# Patient Record
Sex: MALE | Race: WHITE | NOT HISPANIC OR LATINO | Employment: FULL TIME | URBAN - METROPOLITAN AREA
[De-identification: names, ages, dates, MRNs, and addresses within clinical notes are randomized per-mention and may not be internally consistent; named-entity substitution may affect disease eponyms.]

---

## 2019-05-31 ENCOUNTER — OFFICE VISIT (OUTPATIENT)
Dept: ORTHOPEDIC SURGERY | Facility: CLINIC | Age: 44
End: 2019-05-31

## 2019-05-31 VITALS — TEMPERATURE: 97.7 F | HEIGHT: 70 IN | BODY MASS INDEX: 26.34 KG/M2 | WEIGHT: 184 LBS

## 2019-05-31 DIAGNOSIS — S83.512A RUPTURE OF ANTERIOR CRUCIATE LIGAMENT OF LEFT KNEE, INITIAL ENCOUNTER: ICD-10-CM

## 2019-05-31 DIAGNOSIS — M25.562 ACUTE PAIN OF LEFT KNEE: Primary | ICD-10-CM

## 2019-05-31 PROCEDURE — 73562 X-RAY EXAM OF KNEE 3: CPT | Performed by: ORTHOPAEDIC SURGERY

## 2019-05-31 PROCEDURE — 99204 OFFICE O/P NEW MOD 45 MIN: CPT | Performed by: ORTHOPAEDIC SURGERY

## 2019-05-31 RX ORDER — BUDESONIDE AND FORMOTEROL FUMARATE DIHYDRATE 160; 4.5 UG/1; UG/1
AEROSOL RESPIRATORY (INHALATION)
COMMUNITY
Start: 2018-01-16

## 2019-05-31 RX ORDER — IBUPROFEN 400 MG/1
200 TABLET ORAL
COMMUNITY

## 2024-03-13 ENCOUNTER — OFFICE VISIT (OUTPATIENT)
Dept: FAMILY MEDICINE CLINIC | Facility: CLINIC | Age: 49
End: 2024-03-13
Payer: COMMERCIAL

## 2024-03-13 VITALS
HEIGHT: 69 IN | DIASTOLIC BLOOD PRESSURE: 88 MMHG | SYSTOLIC BLOOD PRESSURE: 130 MMHG | OXYGEN SATURATION: 95 % | HEART RATE: 88 BPM | BODY MASS INDEX: 29.18 KG/M2 | RESPIRATION RATE: 16 BRPM | TEMPERATURE: 97.8 F | WEIGHT: 197 LBS

## 2024-03-13 DIAGNOSIS — J45.20 MILD INTERMITTENT ASTHMA WITHOUT COMPLICATION: ICD-10-CM

## 2024-03-13 DIAGNOSIS — J30.1 SEASONAL ALLERGIC RHINITIS DUE TO POLLEN: Primary | ICD-10-CM

## 2024-03-13 DIAGNOSIS — Z12.11 SCREEN FOR COLON CANCER: ICD-10-CM

## 2024-03-13 RX ORDER — CLOBETASOL PROPIONATE 0.5 MG/G
CREAM TOPICAL DAILY
COMMUNITY
Start: 2023-07-28 | End: 2024-07-27

## 2024-03-13 RX ORDER — SILDENAFIL CITRATE 20 MG/1
20 TABLET ORAL DAILY
COMMUNITY
Start: 2024-02-02

## 2024-03-13 RX ORDER — ALBUTEROL SULFATE 90 UG/1
1-2 AEROSOL, METERED RESPIRATORY (INHALATION)
COMMUNITY
Start: 2024-03-11 | End: 2024-03-13 | Stop reason: SDUPTHER

## 2024-03-13 RX ORDER — METHYLPREDNISOLONE ACETATE 80 MG/ML
80 INJECTION, SUSPENSION INTRA-ARTICULAR; INTRALESIONAL; INTRAMUSCULAR; SOFT TISSUE ONCE
Status: COMPLETED | OUTPATIENT
Start: 2024-03-13 | End: 2024-03-13

## 2024-03-13 RX ADMIN — METHYLPREDNISOLONE ACETATE 80 MG: 80 INJECTION, SUSPENSION INTRA-ARTICULAR; INTRALESIONAL; INTRAMUSCULAR; SOFT TISSUE at 11:56

## 2024-03-13 NOTE — PATIENT INSTRUCTIONS
Discharge instruction for allergies Depo-Medrol injection,      Recommend in addition to combination and try this for up to 3 weeks,    Some type of nasal steroid combination with Astelin  If need be add Nasalcrom OTC [employer]      1 strategy for severe allergies would be 1 spray of each twice a day and pick which combination works best for you,    If ever needed Alaway for eye allergies in addition to, OTC      For uncontrolled asthma follow-up with allergy and asthma if ever otherwise use a controller year-round, rinse mouth.    Follow-up annual physical annual labs

## 2024-03-20 PROBLEM — J30.1 SEASONAL ALLERGIC RHINITIS DUE TO POLLEN: Status: ACTIVE | Noted: 2024-03-20

## 2024-03-20 PROBLEM — J45.20 MILD INTERMITTENT ASTHMA WITHOUT COMPLICATION: Status: ACTIVE | Noted: 2024-03-20

## 2024-03-20 RX ORDER — BUDESONIDE AND FORMOTEROL FUMARATE DIHYDRATE 160; 4.5 UG/1; UG/1
2 AEROSOL RESPIRATORY (INHALATION)
Qty: 3 EACH | Refills: 3 | Status: SHIPPED | OUTPATIENT
Start: 2024-03-20

## 2024-03-20 RX ORDER — ALBUTEROL SULFATE 90 UG/1
2 AEROSOL, METERED RESPIRATORY (INHALATION) EVERY 4 HOURS PRN
Qty: 6.7 G | Refills: 3 | Status: SHIPPED | OUTPATIENT
Start: 2024-03-20

## 2024-03-20 NOTE — PROGRESS NOTES
"Chief Complaint  Sinusitis (Pt having issue with sinus congestion, runny nose for the past few day )    Subjective        James Hilliard presents to [facility]  History of Present Illness  Pleasant gentleman here today has allergies, sinus allergies congestion sneezing, as well as history of asthma, wheezing, chronic asthma, generally healthy otherwise no high fever increased chest pain or increased shortness of breath.  He has gotten Depo-Medrol injections in the past they really helped  Social history no tobacco drug alcohol abuse,  Past medical history asthma          Sinusitis      Objective   Vital Signs:  /88   Pulse 88   Temp 97.8 °F (36.6 °C) (Infrared)   Resp 16   Ht 175.3 cm (69\")   Wt 89.4 kg (197 lb)   SpO2 95%   BMI 29.09 kg/m²   Estimated body mass index is 29.09 kg/m² as calculated from the following:    Height as of this encounter: 175.3 cm (69\").    Weight as of this encounter: 89.4 kg (197 lb).          Physical Exam  Vitals reviewed.   Constitutional:       General: He is not in acute distress.     Appearance: He is well-developed. He is not ill-appearing, toxic-appearing or diaphoretic.   HENT:      Head: Normocephalic.      Ears:      Comments: Turbinates congested pharynx is clear TMs are dull, neck is supple no cervical apathy     Nose: Nose normal.   Eyes:      General: No scleral icterus.     Conjunctiva/sclera: Conjunctivae normal.      Pupils: Pupils are equal, round, and reactive to light.   Neck:      Thyroid: No thyromegaly.      Vascular: No JVD.   Cardiovascular:      Rate and Rhythm: Normal rate and regular rhythm.      Heart sounds: Normal heart sounds. No murmur heard.     No friction rub. No gallop.   Pulmonary:      Effort: Pulmonary effort is normal. No respiratory distress.      Breath sounds: No stridor. No rales.   Abdominal:      General: Bowel sounds are normal. There is no distension.      Palpations: Abdomen is soft.      " Tenderness: There is no abdominal tenderness.      Comments: No hepatosplenomegaly, no ascites,   Musculoskeletal:         General: No tenderness.      Cervical back: Neck supple.   Lymphadenopathy:      Cervical: No cervical adenopathy.   Skin:     General: Skin is warm and dry.      Findings: No erythema or rash.   Neurological:      General: No focal deficit present.      Mental Status: He is alert and oriented to person, place, and time. Mental status is at baseline.      Deep Tendon Reflexes: Reflexes are normal and symmetric.   Psychiatric:         Behavior: Behavior normal.         Thought Content: Thought content normal.         Judgment: Judgment normal.      Result Review :                Assessment and Plan   Diagnoses and all orders for this visit:    1. Seasonal allergic rhinitis due to pollen (Primary)  -     methylPREDNISolone acetate (DEPO-medrol) injection 80 mg    2. Screen for colon cancer  -     Ambulatory Referral For Screening Colonoscopy    3. Mild intermittent asthma without complication    Other orders  -     albuterol sulfate  (90 Base) MCG/ACT inhaler; Inhale 2 puffs Every 4 (Four) Hours As Needed for Wheezing.  Dispense: 6.7 g; Refill: 3  -     budesonide-formoterol (Symbicort) 160-4.5 MCG/ACT inhaler; Inhale 2 puffs 2 (Two) Times a Day.  Dispense: 3 each; Refill: 3             Follow Up   No follow-ups on file.  Patient was given instructions and counseling regarding his condition or for health maintenance advice. Please see specific information pulled into the AVS if appropriate.     Patient Instructions   Discharge instruction for allergies Depo-Medrol injection,      Recommend in addition to combination and try this for up to 3 weeks,    Some type of nasal steroid combination with Astelin  If need be add Nasalcrom OTC [employer]      1 strategy for severe allergies would be 1 spray of each twice a day and pick which combination works best for you,    If ever needed Alaway for eye  allergies in addition to, OTC      For uncontrolled asthma follow-up with allergy and asthma if ever otherwise use a controller year-round, rinse mouth.    Follow-up annual physical annual labs

## 2024-07-11 DIAGNOSIS — Z12.11 SCREEN FOR COLON CANCER: Primary | ICD-10-CM

## 2024-07-25 ENCOUNTER — OFFICE VISIT (OUTPATIENT)
Dept: FAMILY MEDICINE CLINIC | Facility: CLINIC | Age: 49
End: 2024-07-25
Payer: COMMERCIAL

## 2024-07-25 VITALS
OXYGEN SATURATION: 96 % | RESPIRATION RATE: 14 BRPM | TEMPERATURE: 97.7 F | HEART RATE: 93 BPM | WEIGHT: 198.6 LBS | SYSTOLIC BLOOD PRESSURE: 132 MMHG | HEIGHT: 69 IN | BODY MASS INDEX: 29.41 KG/M2 | DIASTOLIC BLOOD PRESSURE: 86 MMHG

## 2024-07-25 DIAGNOSIS — R22.1 NECK MASS: Primary | ICD-10-CM

## 2024-07-25 PROCEDURE — 99213 OFFICE O/P EST LOW 20 MIN: CPT | Performed by: NURSE PRACTITIONER

## 2024-07-25 NOTE — PROGRESS NOTES
"Chief Complaint  Neck Pain and clavical pain     Subjective        James Hilliard presents to [facility]  History of Present Illness  Very pleasant gentleman here today he complains of some palpable increased tissue density, right base of his neck and supraclavicular region, feels little bit of tenderness when he moves his neck,  About a month and a half he has no night sweats unexplained weight loss no recurrent infection no chronic abdominal pain  No difficulty swallowing paresthesias other difficulties nothing radiates from his C-spine  Generally good health, does take ibuprofen for aches and pains      No history of malignancy  Neck Pain         Objective   Vital Signs:  /86   Pulse 93   Temp 97.7 °F (36.5 °C) (Infrared)   Resp 14   Ht 175.3 cm (69\")   Wt 90.1 kg (198 lb 9.6 oz)   SpO2 96%   BMI 29.33 kg/m²   Estimated body mass index is 29.33 kg/m² as calculated from the following:    Height as of this encounter: 175.3 cm (69\").    Weight as of this encounter: 90.1 kg (198 lb 9.6 oz).          Physical Exam  Vitals reviewed.   Constitutional:       Appearance: Normal appearance. He is well-developed. He is not ill-appearing or diaphoretic.      Comments: Pleasant gentleman appears well   HENT:      Head: Normocephalic.      Nose: Nose normal.   Eyes:      General: No scleral icterus.     Conjunctiva/sclera: Conjunctivae normal.      Pupils: Pupils are equal, round, and reactive to light.   Neck:      Thyroid: No thyromegaly.      Vascular: No JVD.   Cardiovascular:      Rate and Rhythm: Normal rate and regular rhythm.      Heart sounds: Normal heart sounds. No murmur heard.     No friction rub. No gallop.   Pulmonary:      Effort: Pulmonary effort is normal. No respiratory distress.      Breath sounds: Normal breath sounds. No stridor. No wheezing or rales.   Abdominal:      General: Bowel sounds are normal. There is no distension.      Palpations: Abdomen is soft.     "  Tenderness: There is no abdominal tenderness.      Comments: No hepatosplenomegaly, no ascites, normal spleen normal liver     Musculoskeletal:         General: No tenderness.      Cervical back: Neck supple.      Comments: Full range of motion neck, with right rotation has pain at the supraclavicular region at the base of his neck  No posterior or anterior cervical nodes, neck is supple, no cervical point tenderness  No weakness upper extremities,     Possibly somewhat prominent tissue heard supraclavicular nodes palpable bilateral and somewhat increased on the right enlarged lymph node versus tissue enlargement nonspecific   Lymphadenopathy:      Cervical: No cervical adenopathy.   Skin:     General: Skin is warm and dry.      Findings: No erythema or rash.   Neurological:      General: No focal deficit present.      Mental Status: He is alert and oriented to person, place, and time. Mental status is at baseline.      Deep Tendon Reflexes: Reflexes are normal and symmetric.   Psychiatric:         Mood and Affect: Mood normal.         Behavior: Behavior normal.         Thought Content: Thought content normal.         Judgment: Judgment normal.        Result Review :                Assessment and Plan   Diagnoses and all orders for this visit:    1. Neck mass (Primary)  Comments:  rt supr clav ? lymphnode  Orders:  -     US Head Neck Soft Tissue             Follow Up   Return wk note return today plse.  Patient was given instructions and counseling regarding his condition or for health maintenance advice. Please see specific information pulled into the AVS if appropriate.     Patient Instructions     May continue ibuprofen as needed ibuprofen always lowest effective dose shortest time possible take with food and extra water to protect kidneys,    Monitor blood pressure make sure is controlled quite a bit of risk with ibuprofen    Outpatient soft tissue scan, if enlarged lymph nodes in these region we would need to  get a chest CT,   Make sure you speak with me on the results, as well as you will need a good follow-up regardless of the result  Just to make sure were very thorough with this complaint today and we have resolution let me know any difficulties along the way chest pain shortness of breath fever chills weakness emergency room

## 2024-07-25 NOTE — PATIENT INSTRUCTIONS
May continue ibuprofen as needed ibuprofen always lowest effective dose shortest time possible take with food and extra water to protect kidneys,    Monitor blood pressure make sure is controlled quite a bit of risk with ibuprofen    Outpatient soft tissue scan, if enlarged lymph nodes in these region we would need to get a chest CT,   Make sure you speak with me on the results, as well as you will need a good follow-up regardless of the result  Just to make sure were very thorough with this complaint today and we have resolution let me know any difficulties along the way chest pain shortness of breath fever chills weakness emergency room

## 2024-07-30 ENCOUNTER — TELEPHONE (OUTPATIENT)
Dept: FAMILY MEDICINE CLINIC | Facility: CLINIC | Age: 49
End: 2024-07-30
Payer: COMMERCIAL

## 2024-07-30 NOTE — TELEPHONE ENCOUNTER
It was just approved by insurance. PT is going to call scheduling and will call the office back if he needs assistance.

## 2024-07-30 NOTE — TELEPHONE ENCOUNTER
Initial workup the ultrasound would be the best test as well as less risk  If the ultrasound suggests enlarged lymph node then I will suggest to recommend a CT of the chest with contrast with and without contrast  But initially I think the ultrasound is the most appropriate test    If the ultrasound is negative,  Then I will recommend a short-term follow-up such as 3 months with myself or another physician  To ensure resolution and no change    Let me know if this works for him please thank you

## 2024-07-30 NOTE — TELEPHONE ENCOUNTER
Caller: James Hilliard    Relationship: Self    Best call back number: [phone]    Who are you requesting to speak with (clinical staff, provider,  specific staff member): CLINICAL STAFF    What was the call regarding: STATED THAT THEY WERE TOLD TO CALL IF THEY HAD NOT HEARD ANYTHING ABOUT SCHEDULING THE ULTRASOUND THAT WAS ORDERED. STATED THAT THERE WAS ALSO MENTION OF DOING A CT SCAN IF THE ULTRASOUND DIDN'T GET WHAT WAS NEEDED. STATED THAT THEY WOULD LIKE TO KNOW IF IT IS MORE NECESSARY TO GET THE CT SCAN TO BEGIN WITH. STATED THAT THEY ARE OKAY WITH DOING IT THIS WAY IF THAT IS WHAT NABIL FEELS NEEDS TO BE DONE BUT THEY JUST WANTED TO CHECK. PLEASE CALL AND ADVISE

## 2024-07-30 NOTE — TELEPHONE ENCOUNTER
Caller: James Hilliard    Relationship: Self    Best call back number: [phone]     What is the best time to reach you: ANY AND ASAP    Who are you requesting to speak with (clinical staff, provider,  specific staff member): JAMES EPLEY      What was the call regarding: PATIENT WAS WANTING TO KNOW OUT OF THE ULTRASOUND OR CT SCAN WHICH ONE YOU THOUGHT HE NEEDED THE MOST.  PATIENT WAS ORIGINALLY CONCERNED ABOUT THE COUCH, BUT THEY ARE ACTUALLY THE SAME COST. PATIENT WOULD LIKE TO DO WHICH EVER ONE IS MORE DETAILED.     PLEASE CALL PATIENT TO ADVISE.

## 2024-08-01 NOTE — TELEPHONE ENCOUNTER
Patient is considered about price as both test are 500.00 out of pocket. Pt will call [facility] for pricing and call the office back. I will speak with him when he calls back.

## 2024-08-01 NOTE — TELEPHONE ENCOUNTER
Hub staff attempted to follow warm transfer process and was unsuccessful     Caller: James Hilliard    Relationship to patient: Self    Best call back number:     [phone]        Patient is needing: PATIENT IS CALLING BACK CHECKING ON THIS? PLEASE CALL WITH UPDATE. HE HAS AN APPOINTMENT ON MONDAY 8-5-24. IT HAS BEEN 3 DAYS AND STILL HAS NOT HEARD BACK.

## 2024-08-14 ENCOUNTER — OFFICE VISIT (OUTPATIENT)
Dept: FAMILY MEDICINE CLINIC | Facility: CLINIC | Age: 49
End: 2024-08-14
Payer: COMMERCIAL

## 2024-08-14 VITALS
HEART RATE: 75 BPM | TEMPERATURE: 98.8 F | SYSTOLIC BLOOD PRESSURE: 130 MMHG | RESPIRATION RATE: 16 BRPM | OXYGEN SATURATION: 96 % | HEIGHT: 69 IN | BODY MASS INDEX: 29.64 KG/M2 | WEIGHT: 200.1 LBS | DIASTOLIC BLOOD PRESSURE: 98 MMHG

## 2024-08-14 DIAGNOSIS — F41.9 ANXIETY: ICD-10-CM

## 2024-08-14 DIAGNOSIS — Z00.00 HEALTH MAINTENANCE EXAMINATION: Primary | ICD-10-CM

## 2024-08-14 DIAGNOSIS — E78.2 MIXED HYPERLIPIDEMIA: ICD-10-CM

## 2024-08-14 DIAGNOSIS — E55.9 VITAMIN D DEFICIENCY: ICD-10-CM

## 2024-08-14 DIAGNOSIS — Z12.5 PROSTATE CANCER SCREENING: ICD-10-CM

## 2024-08-14 PROCEDURE — 99396 PREV VISIT EST AGE 40-64: CPT | Performed by: NURSE PRACTITIONER

## 2024-08-14 NOTE — PATIENT INSTRUCTIONS
Discharge instruction    Labs today, will aggressively treat your cholesterol and your triglycerides will work together to lower your triglycerides  As well as to assist you in some steps that might help you quit smoking,  And with some modest weight loss,    Will help you get your blood pressure down a bit, and have other discussions including discussions regarding possible anxiety, or other difficulties that you may have,    Will discuss about screenings to lower risk of stroke and heart attack in early defecation mostly as prevention at this time,    Continue healthy diet and exercise, lots of water 64 ounces water daily,    Follow-up in 3 months to repeat and evaluate right neck region, if there is any question at that time we will refer you to specialist and repeat scanning,

## 2024-08-14 NOTE — PROGRESS NOTES
"Chief Complaint  Hyperlipidemia (Wants to start back on cholesterol medicine; previously on atorvastatin and fenofibrate) and Follow-up (On ultrasound)    Subjective        James Hilliard presents to [facility]  History of Present Illness  Very pleasant gentleman here today follow-up, with increased tissue, possible enlargement of the subclavicular region, of his neck he has some mild discomfort in, and some swelling bilateral but this is chronic nothing new,  Ultrasound recently was negative for any cyst mass or lymphadenopathy, thankfully,  He has no night sweats no unexplained weight loss no recurrent fevers, no family history of Hodgkin's disease or lymphoma,    He has a history of hypertriglyceridemia, dyslipidemia, elevated triglycerides fibrate and statin will need refills will get his labs today he is fasting,  He decreased alcohol use substantially in the past, he has had elevated triglycerides dyslipidemia and taking medicine for at least 7 or 8 years,  He does drink alcohol responsibly, 2 to 3 days a week and more of a larger consumption    Quite a bit of anxiety maybe OCD has never had a evaluation          Hyperlipidemia    Follow-up  Conditions present:  Hyperlipidemia        Objective   Vital Signs:  /98 (BP Location: Right arm, Patient Position: Sitting, Cuff Size: Adult)   Pulse 75   Temp 98.8 °F (37.1 °C) (Oral)   Resp 16   Ht 175.3 cm (69\")   Wt 90.8 kg (200 lb 1.6 oz)   SpO2 96%   BMI 29.55 kg/m²   Estimated body mass index is 29.55 kg/m² as calculated from the following:    Height as of this encounter: 175.3 cm (69\").    Weight as of this encounter: 90.8 kg (200 lb 1.6 oz).          Physical Exam  Vitals reviewed.   Constitutional:       General: He is not in acute distress.     Appearance: Normal appearance. He is well-developed. He is not ill-appearing, toxic-appearing or diaphoretic.   HENT:      Head: Normocephalic.      Nose: Nose normal.   Eyes: "      General: No scleral icterus.     Conjunctiva/sclera: Conjunctivae normal.      Pupils: Pupils are equal, round, and reactive to light.   Neck:      Thyroid: No thyromegaly.      Vascular: No JVD.      Comments: Reexamined neck carefully  No cervical adenopathy no posterior anterior nodes no lateral neck abnormal nodes supraclavicular region free of any mass, he has bilateral appears equal, soft tissue  Prominence equal and likely related to diet and modestly overweight  Appears to be adipose tissue as opposed to any lipoma palpable, really no abnormality palpated  Careful and repeat bilateral supraclavicular region no lymphadenopathy or palpable mass  Patient agrees,  Cardiovascular:      Rate and Rhythm: Normal rate and regular rhythm.      Heart sounds: Normal heart sounds. No murmur heard.     No friction rub. No gallop.   Pulmonary:      Effort: Pulmonary effort is normal. No respiratory distress.      Breath sounds: Normal breath sounds. No stridor. No wheezing or rales.   Abdominal:      General: Bowel sounds are normal. There is no distension.      Palpations: Abdomen is soft.      Tenderness: There is no abdominal tenderness.      Comments: No hepatosplenomegaly, no ascites,   Musculoskeletal:         General: No tenderness.      Cervical back: Neck supple.   Lymphadenopathy:      Cervical: No cervical adenopathy.   Skin:     General: Skin is warm and dry.      Capillary Refill: Capillary refill takes less than 2 seconds.      Findings: No erythema or rash.   Neurological:      General: No focal deficit present.      Mental Status: He is alert and oriented to person, place, and time. Mental status is at baseline.      Deep Tendon Reflexes: Reflexes are normal and symmetric.   Psychiatric:         Mood and Affect: Mood normal.         Behavior: Behavior normal.         Thought Content: Thought content normal.         Judgment: Judgment normal.        Result Review :                Assessment and Plan    Diagnoses and all orders for this visit:    1. Health maintenance examination (Primary)  -     PSA Screen  -     Hemoglobin A1c  -     CBC & Differential  -     Comprehensive Metabolic Panel  -     Lipid Panel With LDL / HDL Ratio  -     TSH Rfx On Abnormal To Free T4  -     Urinalysis With Microscopic If Indicated (No Culture) - Urine, Clean Catch  -     Vitamin D,25-Hydroxy    2. Prostate cancer screening  -     PSA Screen  -     Hemoglobin A1c  -     CBC & Differential  -     Comprehensive Metabolic Panel  -     Lipid Panel With LDL / HDL Ratio  -     TSH Rfx On Abnormal To Free T4  -     Urinalysis With Microscopic If Indicated (No Culture) - Urine, Clean Catch  -     Vitamin D,25-Hydroxy    3. Vitamin D deficiency  -     PSA Screen  -     Hemoglobin A1c  -     CBC & Differential  -     Comprehensive Metabolic Panel  -     Lipid Panel With LDL / HDL Ratio  -     TSH Rfx On Abnormal To Free T4  -     Urinalysis With Microscopic If Indicated (No Culture) - Urine, Clean Catch  -     Vitamin D,25-Hydroxy    4. Mixed hyperlipidemia  -     PSA Screen  -     Hemoglobin A1c  -     CBC & Differential  -     Comprehensive Metabolic Panel  -     Lipid Panel With LDL / HDL Ratio  -     TSH Rfx On Abnormal To Free T4  -     Urinalysis With Microscopic If Indicated (No Culture) - Urine, Clean Catch  -     Vitamin D,25-Hydroxy    5. Anxiety  Comments:  Patient attributes to OCD behaviors             Follow Up   Return in about 1 week (around 8/21/2024) for Labs today.  Patient was given instructions and counseling regarding his condition or for health maintenance advice. Please see specific information pulled into the AVS if appropriate.   Offered referral to ENT, for further evaluation neither myself nor patient can palpate an abnormal mass, this is excess adipose tissue in this region bilateral supraclavicular regions without lymphadenopathy ultrasound was negative nonetheless we should have a follow-up in 3 months to  repeat this exam or second opinion anytime should he develop a mass here urgent recheck    If any lymphadenopathy is regional course he would need a CT of the chest with contrast and he is aware of this he has no symptoms of lymphoma unexplained weight loss night sweats recurrent fevers weakness    Return office discussed anxiety OCD behaviors, will focus aggressively on risk reduction of stroke heart attack and smoking cessation    Patient Instructions   Discharge instruction    Labs today, will aggressively treat your cholesterol and your triglycerides will work together to lower your triglycerides  As well as to assist you in some steps that might help you quit smoking,  And with some modest weight loss,    Will help you get your blood pressure down a bit, and have other discussions including discussions regarding possible anxiety, or other difficulties that you may have,    Will discuss about screenings to lower risk of stroke and heart attack in early defecation mostly as prevention at this time,    Continue healthy diet and exercise, lots of water 64 ounces water daily,    Follow-up in 3 months to repeat and evaluate right neck region, if there is any question at that time we will refer you to specialist and repeat scanning,

## 2024-08-15 LAB
25(OH)D3+25(OH)D2 SERPL-MCNC: 29.4 NG/ML (ref 30–100)
ALBUMIN SERPL-MCNC: 4.5 G/DL (ref 3.5–5.2)
ALBUMIN/GLOB SERPL: 1.9 G/DL
ALP SERPL-CCNC: 111 U/L (ref 39–117)
ALT SERPL-CCNC: 63 U/L (ref 1–41)
APPEARANCE UR: CLEAR
AST SERPL-CCNC: 38 U/L (ref 1–40)
BASOPHILS # BLD AUTO: 0.04 10*3/MM3 (ref 0–0.2)
BASOPHILS NFR BLD AUTO: 0.6 % (ref 0–1.5)
BILIRUB SERPL-MCNC: 0.5 MG/DL (ref 0–1.2)
BILIRUB UR QL STRIP: NEGATIVE
BUN SERPL-MCNC: 13 MG/DL (ref 6–20)
BUN/CREAT SERPL: 14.3 (ref 7–25)
CALCIUM SERPL-MCNC: 9.2 MG/DL (ref 8.6–10.5)
CHLORIDE SERPL-SCNC: 109 MMOL/L (ref 98–107)
CHOLEST SERPL-MCNC: 215 MG/DL (ref 0–200)
CO2 SERPL-SCNC: 26.3 MMOL/L (ref 22–29)
COLOR UR: YELLOW
CREAT SERPL-MCNC: 0.91 MG/DL (ref 0.76–1.27)
EGFRCR SERPLBLD CKD-EPI 2021: 104 ML/MIN/1.73
EOSINOPHIL # BLD AUTO: 0.16 10*3/MM3 (ref 0–0.4)
EOSINOPHIL NFR BLD AUTO: 2.3 % (ref 0.3–6.2)
ERYTHROCYTE [DISTWIDTH] IN BLOOD BY AUTOMATED COUNT: 12.7 % (ref 12.3–15.4)
GLOBULIN SER CALC-MCNC: 2.4 GM/DL
GLUCOSE SERPL-MCNC: 101 MG/DL (ref 65–99)
GLUCOSE UR QL STRIP: NEGATIVE
HBA1C MFR BLD: 5.7 % (ref 4.8–5.6)
HCT VFR BLD AUTO: 50.8 % (ref 37.5–51)
HDLC SERPL-MCNC: 32 MG/DL (ref 40–60)
HGB BLD-MCNC: 17.8 G/DL (ref 13–17.7)
HGB UR QL STRIP: NEGATIVE
IMM GRANULOCYTES # BLD AUTO: 0.06 10*3/MM3 (ref 0–0.05)
IMM GRANULOCYTES NFR BLD AUTO: 0.9 % (ref 0–0.5)
KETONES UR QL STRIP: NEGATIVE
LDLC SERPL CALC-MCNC: 125 MG/DL (ref 0–100)
LDLC/HDLC SERPL: 3.66 {RATIO}
LEUKOCYTE ESTERASE UR QL STRIP: NEGATIVE
LYMPHOCYTES # BLD AUTO: 2.35 10*3/MM3 (ref 0.7–3.1)
LYMPHOCYTES NFR BLD AUTO: 33.8 % (ref 19.6–45.3)
MCH RBC QN AUTO: 31.4 PG (ref 26.6–33)
MCHC RBC AUTO-ENTMCNC: 35 G/DL (ref 31.5–35.7)
MCV RBC AUTO: 89.8 FL (ref 79–97)
MONOCYTES # BLD AUTO: 0.47 10*3/MM3 (ref 0.1–0.9)
MONOCYTES NFR BLD AUTO: 6.8 % (ref 5–12)
NEUTROPHILS # BLD AUTO: 3.87 10*3/MM3 (ref 1.7–7)
NEUTROPHILS NFR BLD AUTO: 55.6 % (ref 42.7–76)
NITRITE UR QL STRIP: NEGATIVE
NRBC BLD AUTO-RTO: 0 /100 WBC (ref 0–0.2)
PH UR STRIP: 6 [PH] (ref 5–8)
PLATELET # BLD AUTO: 187 10*3/MM3 (ref 140–450)
POTASSIUM SERPL-SCNC: 5.7 MMOL/L (ref 3.5–5.2)
PROT SERPL-MCNC: 6.9 G/DL (ref 6–8.5)
PROT UR QL STRIP: NEGATIVE
PSA SERPL-MCNC: 0.24 NG/ML (ref 0–4)
RBC # BLD AUTO: 5.66 10*6/MM3 (ref 4.14–5.8)
SODIUM SERPL-SCNC: 142 MMOL/L (ref 136–145)
SP GR UR STRIP: 1.02 (ref 1–1.03)
TRIGL SERPL-MCNC: 329 MG/DL (ref 0–150)
TSH SERPL DL<=0.005 MIU/L-ACNC: 1.8 UIU/ML (ref 0.27–4.2)
UROBILINOGEN UR STRIP-MCNC: NORMAL MG/DL
VLDLC SERPL CALC-MCNC: 58 MG/DL (ref 5–40)
WBC # BLD AUTO: 6.95 10*3/MM3 (ref 3.4–10.8)

## 2024-08-21 ENCOUNTER — OFFICE VISIT (OUTPATIENT)
Dept: FAMILY MEDICINE CLINIC | Facility: CLINIC | Age: 49
End: 2024-08-21
Payer: COMMERCIAL

## 2024-08-21 VITALS
HEIGHT: 69 IN | RESPIRATION RATE: 16 BRPM | TEMPERATURE: 98.1 F | SYSTOLIC BLOOD PRESSURE: 138 MMHG | BODY MASS INDEX: 29.53 KG/M2 | OXYGEN SATURATION: 98 % | HEART RATE: 80 BPM | DIASTOLIC BLOOD PRESSURE: 98 MMHG | WEIGHT: 199.4 LBS

## 2024-08-21 DIAGNOSIS — F41.9 ANXIETY: Primary | ICD-10-CM

## 2024-08-21 DIAGNOSIS — E78.1 HIGH TRIGLYCERIDES: ICD-10-CM

## 2024-08-21 DIAGNOSIS — E66.3 OVERWEIGHT WITH BODY MASS INDEX (BMI) OF 29 TO 29.9 IN ADULT: ICD-10-CM

## 2024-08-21 DIAGNOSIS — E78.6 LOW HDL (UNDER 40): ICD-10-CM

## 2024-08-21 DIAGNOSIS — R73.03 PREDIABETES: ICD-10-CM

## 2024-08-21 DIAGNOSIS — F10.10 ALCOHOL ABUSE: ICD-10-CM

## 2024-08-21 DIAGNOSIS — Z72.0 TOBACCO ABUSE: ICD-10-CM

## 2024-08-21 DIAGNOSIS — R74.8 ELEVATED LIVER ENZYMES: ICD-10-CM

## 2024-08-21 PROCEDURE — 99214 OFFICE O/P EST MOD 30 MIN: CPT | Performed by: NURSE PRACTITIONER

## 2024-08-21 RX ORDER — ATORVASTATIN CALCIUM 40 MG/1
40 TABLET, FILM COATED ORAL DAILY
Qty: 90 TABLET | Refills: 3 | Status: SHIPPED | OUTPATIENT
Start: 2024-08-21

## 2024-08-21 RX ORDER — SEMAGLUTIDE 0.25 MG/.5ML
0.25 INJECTION, SOLUTION SUBCUTANEOUS WEEKLY
Qty: 2 ML | Refills: 0 | Status: SHIPPED | OUTPATIENT
Start: 2024-08-21

## 2024-08-21 RX ORDER — ESCITALOPRAM OXALATE 5 MG/1
5 TABLET ORAL DAILY
Qty: 30 TABLET | Refills: 1 | Status: SHIPPED | OUTPATIENT
Start: 2024-08-21

## 2024-08-21 RX ORDER — ICOSAPENT ETHYL 1 G/1
2 CAPSULE ORAL 2 TIMES DAILY WITH MEALS
Qty: 360 CAPSULE | Refills: 3 | Status: SHIPPED | OUTPATIENT
Start: 2024-08-21

## 2024-08-21 NOTE — PATIENT INSTRUCTIONS
Discharge instructions,      Wegovy to help decrease appetite to help get your metabolic goals and decrease risk of fatty liver as well as diabetes  You have diabetes and this medication can help reverse this you should follow American diabetes [employer] plan for diabetics and prediabetes there is no substitute diet for you    People places and things, make these changes to help you with your diet, decrease alcohol intake no more than 1 night a week, and I would cut it at least in half  Would be a compromise    Quit smoking when able, just plan these out for better results,      Resume atorvastatin, will see if your insurance will cover Vascepa, as well as Wegovy  If not no stress let me know for alternative plan    Slow steady changes for good health, slow steady weight loss consider intermittent fasting vegetables vegetables vegetables much less bread Posta sweets,    As long as you are doing well and feeling well see back in 3 months, you have prehypertension cut back with breads Posta which are high in sodium tobacco alcohol and this will improve    Out pt liver US     Lexapro 5 mg 1/2 tablet daily start this in the morning, I would start Saturday morning when you are off 1/2 tablet daily for 1 or 2 weeks then 1 tablet daily  Update me for plan    Follow-up in the office in 6 weeks  We can hold off on your liver test as long as it resolves after several months of therapy and improve diet  As you request I think this is reasonable

## 2024-09-02 NOTE — PROGRESS NOTES
"Chief Complaint  Hyperlipidemia    Subjective        James Hilliard presents to [facility]  History of Present Illness  Very pleasant patient here today follow-up prediabetes, hyperlipidemia, elevated blood pressure very motivated about his health,  Would like to quit smoking, making changes,  Chronic anxiety, obsessive-compulsive thoughts,  Has never taking medication for this, open to this, no suicidal ideation agitation    Hyperlipidemia        Objective   Vital Signs:  /98 (BP Location: Left arm, Patient Position: Sitting, Cuff Size: Adult)   Pulse 80   Temp 98.1 °F (36.7 °C) (Oral)   Resp 16   Ht 175.3 cm (69\")   Wt 90.4 kg (199 lb 6.4 oz)   SpO2 98%   BMI 29.45 kg/m²   Estimated body mass index is 29.45 kg/m² as calculated from the following:    Height as of this encounter: 175.3 cm (69\").    Weight as of this encounter: 90.4 kg (199 lb 6.4 oz).            Physical Exam  Vitals reviewed.   Constitutional:       Appearance: Normal appearance. He is well-developed. He is not ill-appearing or diaphoretic.   HENT:      Head: Normocephalic.      Nose: Nose normal.   Eyes:      General: No scleral icterus.     Conjunctiva/sclera: Conjunctivae normal.      Pupils: Pupils are equal, round, and reactive to light.   Neck:      Thyroid: No thyromegaly.      Vascular: No JVD.   Cardiovascular:      Rate and Rhythm: Normal rate and regular rhythm.      Heart sounds: Normal heart sounds. No murmur heard.     No friction rub. No gallop.   Pulmonary:      Effort: Pulmonary effort is normal. No respiratory distress.      Breath sounds: Normal breath sounds. No stridor. No wheezing or rales.   Abdominal:      General: Bowel sounds are normal. There is no distension.      Palpations: Abdomen is soft.      Tenderness: There is no abdominal tenderness.      Comments: No hepatosplenomegaly, no ascites,   Musculoskeletal:         General: No tenderness.      Cervical back: Neck supple. "   Lymphadenopathy:      Cervical: No cervical adenopathy.   Skin:     General: Skin is warm and dry.      Findings: No erythema or rash.   Neurological:      General: No focal deficit present.      Mental Status: He is alert and oriented to person, place, and time. Mental status is at baseline.      Deep Tendon Reflexes: Reflexes are normal and symmetric.   Psychiatric:         Behavior: Behavior normal.         Thought Content: Thought content normal.         Judgment: Judgment normal.        Result Review :                Assessment and Plan   Diagnoses and all orders for this visit:    1. Anxiety (Primary)    2. Prediabetes  -     Semaglutide-Weight Management (Wegovy) 0.25 MG/0.5ML solution auto-injector; Inject 0.5 mL under the skin into the appropriate area as directed 1 (One) Time Per Week.  Dispense: 2 mL; Refill: 0    3. High triglycerides  -     Semaglutide-Weight Management (Wegovy) 0.25 MG/0.5ML solution auto-injector; Inject 0.5 mL under the skin into the appropriate area as directed 1 (One) Time Per Week.  Dispense: 2 mL; Refill: 0    4. Low HDL (under 40)  -     Semaglutide-Weight Management (Wegovy) 0.25 MG/0.5ML solution auto-injector; Inject 0.5 mL under the skin into the appropriate area as directed 1 (One) Time Per Week.  Dispense: 2 mL; Refill: 0    5. Elevated liver enzymes  -     Cancel: US liver    6. Alcohol abuse  Comments:  3 days a week at home responsible    7. Overweight with body mass index (BMI) of 29 to 29.9 in adult  -     Semaglutide-Weight Management (Wegovy) 0.25 MG/0.5ML solution auto-injector; Inject 0.5 mL under the skin into the appropriate area as directed 1 (One) Time Per Week.  Dispense: 2 mL; Refill: 0    8. Tobacco abuse    Other orders  -     atorvastatin (Lipitor) 40 MG tablet; Take 1 tablet by mouth Daily.  Dispense: 90 tablet; Refill: 3  -     icosapent ethyl (VASCEPA) 1 g capsule capsule; Take 2 g by mouth 2 (Two) Times a Day With Meals. For healthy heart and liver   Dispense: 360 capsule; Refill: 3  -     atorvastatin (Lipitor) 40 MG tablet; Take 1 tablet by mouth Daily. For good cardiac health  Dispense: 90 tablet; Refill: 3  -     escitalopram (Lexapro) 5 MG tablet; Take 1 tablet by mouth Daily. For improved anxiety  Dispense: 30 tablet; Refill: 1             Follow Up   Return in about 6 weeks (around 10/2/2024) for Labs before next visit.  Patient was given instructions and counseling regarding his condition or for health maintenance advice. Please see specific information pulled into the AVS if appropriate.     Patient Instructions   Discharge instructions,      Wegovy to help decrease appetite to help get your metabolic goals and decrease risk of fatty liver as well as diabetes  You have diabetes and this medication can help reverse this you should follow American diabetes Association plan for diabetics and prediabetes there is no substitute diet for you    People places and things, make these changes to help you with your diet, decrease alcohol intake no more than 1 night a week, and I would cut it at least in half  Would be a compromise    Quit smoking when able, just plan these out for better results,      Resume atorvastatin, will see if your insurance will cover Vascepa, as well as Wegovy  If not no stress let me know for alternative plan    Slow steady changes for good health, slow steady weight loss consider intermittent fasting vegetables vegetables vegetables much less bread Posta sweets,    As long as you are doing well and feeling well see back in 3 months, you have prehypertension cut back with breads Posta which are high in sodium tobacco alcohol and this will improve    Out pt liver US     Lexapro 5 mg 1/2 tablet daily start this in the morning, I would start Saturday morning when you are off 1/2 tablet daily for 1 or 2 weeks then 1 tablet daily  Update me for plan    Follow-up in the office in 6 weeks  We can hold off on your liver test as long as it  resolves after several months of therapy and improve diet  As you request I think this is reasonable

## 2024-09-04 ENCOUNTER — TELEPHONE (OUTPATIENT)
Dept: FAMILY MEDICINE CLINIC | Facility: CLINIC | Age: 49
End: 2024-09-04

## 2024-09-04 NOTE — TELEPHONE ENCOUNTER
I have already ordered the Wegovy  Make sure we did the PA process already please and update patient    He should take atorvastatin for cholesterol as well as Vascepa no need for fenofibrate  At this time    Let me know if he got his escitalopram 5 mg daily for anxiety  How is he doing with this?  I may go ahead and increase the dose if he is doing okay?  Thank you

## 2024-09-04 NOTE — TELEPHONE ENCOUNTER
Patient says 2/4 meds we have sent in werent there for  and we told him to call if he had any problems

## 2024-09-04 NOTE — TELEPHONE ENCOUNTER
Spw candy with [facility] pharmacy the VASCEPA is covered by insurance with a $30.00 copay. Pts wegovy requires a PA, I had the pharmacy refax over the PA request. Pt is confused about what medication you are wanting him to take for his cholesterol and Triglycerides. Pt is not sure if he is supposed to be taking fenofibrate, atorvastatin, or VASCEPA

## 2024-09-05 NOTE — TELEPHONE ENCOUNTER
Spoke with patient in detail. Voiced understanding. Pt states is has only been on escitalopram for about 1 one week. Wants to give it a little bit longer to see if it will work. Is scheduled 10-2-24 to see PCP for f/u on this drug. Wants you to know the he is still having issues with his neck. States has spoken to you about this before. Has intermittent pain that is not getting better. Please advise.       FYI Wegovy was denied by his insurance for the following reason:  Coverage is provided if the patient meets ONE of the following: prior myocardial infarction,   prior stroke, or history of symptomatic peripheral arterial disease as evidenced by intermittent   claudication with ankle-brachial index less than 0.85, peripheral arterial revascularization   procedure, or amputation due to atherosclerotic disease. Coverage cannot be authorized at   this time    I have no medical documentation to support any  of these.

## 2024-09-05 NOTE — TELEPHONE ENCOUNTER
Have him do neck stretches twice a day 5 or 10 minutes  Avoid pain when doing stretches  Quite a bit of information regarding good exercises online  Or physical therapy if he desires    Do these daily if increasing pain if severe emergency room or here otherwise if need be temporarily can take Aleve 2 tablets twice daily for couple weeks only then stop  Take with food and water    Otherwise if he is not better at his follow-up we will get x-rays,  Sooner if any severe pains    Keep up the good work thank you

## 2024-09-30 ENCOUNTER — HOSPITAL ENCOUNTER (OUTPATIENT)
Dept: GENERAL RADIOLOGY | Facility: HOSPITAL | Age: 49
Discharge: HOME | End: 2024-09-30
Admitting: NURSE PRACTITIONER
Payer: COMMERCIAL

## 2024-09-30 ENCOUNTER — OFFICE VISIT (OUTPATIENT)
Dept: FAMILY MEDICINE CLINIC | Facility: CLINIC | Age: 49
End: 2024-09-30
Payer: COMMERCIAL

## 2024-09-30 VITALS
TEMPERATURE: 97.3 F | WEIGHT: 196.2 LBS | OXYGEN SATURATION: 96 % | DIASTOLIC BLOOD PRESSURE: 92 MMHG | HEART RATE: 72 BPM | HEIGHT: 69 IN | SYSTOLIC BLOOD PRESSURE: 126 MMHG | BODY MASS INDEX: 29.06 KG/M2 | RESPIRATION RATE: 12 BRPM

## 2024-09-30 DIAGNOSIS — M89.8X1 PAIN OF RIGHT CLAVICLE: ICD-10-CM

## 2024-09-30 DIAGNOSIS — F41.9 ANXIETY: Primary | ICD-10-CM

## 2024-09-30 DIAGNOSIS — Z23 NEED FOR VACCINATION: ICD-10-CM

## 2024-09-30 DIAGNOSIS — I10 PRIMARY HYPERTENSION: ICD-10-CM

## 2024-09-30 PROCEDURE — 90656 IIV3 VACC NO PRSV 0.5 ML IM: CPT | Performed by: NURSE PRACTITIONER

## 2024-09-30 PROCEDURE — 99214 OFFICE O/P EST MOD 30 MIN: CPT | Performed by: NURSE PRACTITIONER

## 2024-09-30 PROCEDURE — 90471 IMMUNIZATION ADMIN: CPT | Performed by: NURSE PRACTITIONER

## 2024-09-30 PROCEDURE — 73000 X-RAY EXAM OF COLLAR BONE: CPT

## 2024-09-30 RX ORDER — METOPROLOL SUCCINATE 25 MG/1
25 TABLET, EXTENDED RELEASE ORAL DAILY
Qty: 90 TABLET | Refills: 3 | Status: SHIPPED | OUTPATIENT
Start: 2024-09-30

## 2024-09-30 RX ORDER — ESCITALOPRAM OXALATE 10 MG/1
10 TABLET ORAL DAILY
Qty: 30 TABLET | Refills: 1 | Status: SHIPPED | OUTPATIENT
Start: 2024-09-30

## 2024-09-30 NOTE — PATIENT INSTRUCTIONS
Discharge    Increase Lexapro to 10 mg daily update me in 2 weeks we may increase it again at that time but every 2 weeks I would like feedback and follow-up in 6 weeks or so flexible depending on how you are doing and let me know    Lets go ahead and get an x-ray  Of your clavicular region, to make sure everything looks good it may be some referred pain from your C-spine  But otherwise you have a normal cervical external exam no lymphadenopathy, the lymph nodes look well should you have any chronic enlarged lump,  We will work this up the today you have a normal exam and everything looks good here keep up the current work you are doing great    Should you have any increased or progressive chest pain we will get a chest CT but otherwise, you have no abnormal lymph nodes in this region and a normal exam and no red flags, keep up the good work you doing great    Metoprolol low-dose blood pressure medicine, take this daily  Check blood pressure weekly and we will recheck it at your follow-up as well to make sure you continue to need

## 2024-09-30 NOTE — PROGRESS NOTES
"Chief Complaint  Anxiety (6 week f/u)    Subjective        James Hilliard presents to [facility]  History of Present Illness  Follow-up anxiety, some OCD behaviors, chronic, recently started Lexapro helping some but not to any significant extent more hopeful and improve his blood pressures been elevated no chest pain no shortness of breath,  Initially evaluated for possible swelling over his clavicle with normal exam but ultrasound looked good here, occasionally has some pain in his medial aspect of his clavicle is positional which when he turns his head at times just occasional no chronic pain night sweats unexplained weight loss or chronic tenderness or swelling here  Denies any neck pain  Thinks he just worrying too much she has no chronic lumps  Or enlarged lymph nodes, recently sore throat and a tender anterior nodes but is normal now    Quit smoking recently vaping,  Anxiety      Objective   Vital Signs:  /92   Pulse 72   Temp 97.3 °F (36.3 °C) (Infrared)   Resp 12   Ht 175.3 cm (69\")   Wt 89 kg (196 lb 3.2 oz)   SpO2 96%   BMI 28.97 kg/m²   Estimated body mass index is 28.97 kg/m² as calculated from the following:    Height as of this encounter: 175.3 cm (69\").    Weight as of this encounter: 89 kg (196 lb 3.2 oz).            Physical Exam  Vitals reviewed.   Constitutional:       General: He is not in acute distress.     Appearance: Normal appearance. He is well-developed. He is not ill-appearing, toxic-appearing or diaphoretic.      Comments: Pleasant appears well   HENT:      Head: Normocephalic.      Right Ear: Tympanic membrane normal.      Left Ear: Tympanic membrane normal.      Nose: Nose normal.      Mouth/Throat:      Mouth: Mucous membranes are moist.   Eyes:      General: No scleral icterus.     Conjunctiva/sclera: Conjunctivae normal.      Pupils: Pupils are equal, round, and reactive to light.   Neck:      Thyroid: No thyromegaly.      Vascular: No JVD. "      Comments: Careful inspection neck repeated exam carefully no cervical adenopathy no mass no nodules or pulsations normal exam thyroid normal supraclavicular nodes negative  The bony structure clavicle normal no bony abnormality no tenderness or soft tissue abnormalities in this region  Axilla clear  Cardiovascular:      Rate and Rhythm: Normal rate and regular rhythm.      Heart sounds: Normal heart sounds. No murmur heard.     No friction rub. No gallop.   Pulmonary:      Effort: Pulmonary effort is normal. No respiratory distress.      Breath sounds: Normal breath sounds. No stridor. No wheezing or rales.   Abdominal:      General: Bowel sounds are normal. There is no distension.      Palpations: Abdomen is soft.      Tenderness: There is no abdominal tenderness.      Comments: No hepatosplenomegaly, no ascites,   Musculoskeletal:         General: No tenderness.      Cervical back: Neck supple.   Lymphadenopathy:      Cervical: No cervical adenopathy.   Skin:     General: Skin is warm and dry.      Findings: No erythema or rash.   Neurological:      General: No focal deficit present.      Mental Status: He is alert and oriented to person, place, and time. Mental status is at baseline.      Deep Tendon Reflexes: Reflexes are normal and symmetric.   Psychiatric:         Mood and Affect: Mood normal.         Behavior: Behavior normal.         Thought Content: Thought content normal.         Judgment: Judgment normal.      Comments: Appropriate pleasant        Result Review :                Assessment and Plan   Diagnoses and all orders for this visit:    1. Anxiety (Primary)  -     Comprehensive Metabolic Panel; Future  -     Lipid Panel With LDL / HDL Ratio; Future  -     Hemoglobin A1c; Future    2. Pain of right clavicle  -     XR clavicle right  -     Comprehensive Metabolic Panel; Future  -     Lipid Panel With LDL / HDL Ratio; Future  -     Hemoglobin A1c; Future    3. Primary hypertension  -      metoprolol succinate XL (Toprol XL) 25 MG 24 hr tablet; Take 1 tablet by mouth Daily. For blood pressure,  Dispense: 90 tablet; Refill: 3  -     Comprehensive Metabolic Panel; Future  -     Lipid Panel With LDL / HDL Ratio; Future  -     Hemoglobin A1c; Future    Other orders  -     escitalopram (Lexapro) 10 MG tablet; Take 1 tablet by mouth Daily. For improved anxiety  Dispense: 30 tablet; Refill: 1             Follow Up   Return in about 6 weeks (around 11/11/2024), or X-ray today please fasting labs soon.  Patient was given instructions and counseling regarding his condition or for health maintenance advice. Please see specific information pulled into the AVS if appropriate.     Patient Instructions   Discharge    Increase Lexapro to 10 mg daily update me in 2 weeks we may increase it again at that time but every 2 weeks I would like feedback and follow-up in 6 weeks or so flexible depending on how you are doing and let me know    Lets go ahead and get an x-ray  Of your clavicular region, to make sure everything looks good it may be some referred pain from your C-spine  But otherwise you have a normal cervical external exam no lymphadenopathy, the lymph nodes look well should you have any chronic enlarged lump,  We will work this up the today you have a normal exam and everything looks good here keep up the current work you are doing great    Should you have any increased or progressive chest pain we will get a chest CT but otherwise, you have no abnormal lymph nodes in this region and a normal exam and no red flags, keep up the good work you doing great    Metoprolol low-dose blood pressure medicine, take this daily  Check blood pressure weekly and we will recheck it at your follow-up as well to make sure you continue to need

## 2024-11-11 ENCOUNTER — OFFICE VISIT (OUTPATIENT)
Dept: FAMILY MEDICINE CLINIC | Facility: CLINIC | Age: 49
End: 2024-11-11
Payer: COMMERCIAL

## 2024-11-11 VITALS
BODY MASS INDEX: 29.82 KG/M2 | WEIGHT: 201.3 LBS | HEART RATE: 70 BPM | HEIGHT: 69 IN | OXYGEN SATURATION: 96 % | SYSTOLIC BLOOD PRESSURE: 132 MMHG | DIASTOLIC BLOOD PRESSURE: 88 MMHG

## 2024-11-11 DIAGNOSIS — R00.2 PALPITATIONS: ICD-10-CM

## 2024-11-11 DIAGNOSIS — E78.2 MIXED HYPERLIPIDEMIA: ICD-10-CM

## 2024-11-11 DIAGNOSIS — I10 PRIMARY HYPERTENSION: ICD-10-CM

## 2024-11-11 DIAGNOSIS — Z13.6 ENCOUNTER FOR SCREENING FOR VASCULAR DISEASE: ICD-10-CM

## 2024-11-11 DIAGNOSIS — F42.9 OBSESSIVE-COMPULSIVE DISORDER, UNSPECIFIED TYPE: Primary | ICD-10-CM

## 2024-11-11 PROCEDURE — 99214 OFFICE O/P EST MOD 30 MIN: CPT | Performed by: NURSE PRACTITIONER

## 2024-11-11 NOTE — PATIENT INSTRUCTIONS
Increase metoprolol to 50 mg daily for better blood pressure control extended release update me your blood pressure next week at least 2 days and heart rate I will adjust it again likely      Make sure you are taking atorvastatin,  Try to break a sweat doing something fun or some type of exercise daily  Avoid smoking try to wean off when able from nicotine but avoid smoking    Check blood pressure weekly should be less than 130/80 with a statin LDL less than 70  Try to lose 5 or 10 pounds over the next year break a sweat daily    Cardiology, for palpitations, just a good thorough checkup,  Likely will need a Holter monitor I will have cardiology get the ECG and Holter monitor    If racing heart chest pain shortness of breath syncope emergency room    Consider recommend outpatient CT calcium score cheaper [facility] [facility]  50 dollars       Otherwise as long as you are doing well and feeling good I will see you back in 3 months but update me next week on blood pressure readings,    Sertraline 50 mg 1/2 tablet every other day for a week 1 tablet daily thereafter update me  In 6 weeks your condition sooner for any difficulties or problems,    If you have difficulty with the medicine stop we will try something different or see psychiatry otherwise continue to make these good changes in your life, encourage you,              Doing some type of Kalpesh exercise excetra  This is how to lower risk of stroke and heart attack    Strongly encourage you to decrease alcohol, consider Alcoholics Anonymous, if you need naltrexone let me know    .  You change people places things when ready,

## 2024-11-15 ENCOUNTER — TELEPHONE (OUTPATIENT)
Dept: FAMILY MEDICINE CLINIC | Facility: CLINIC | Age: 49
End: 2024-11-15

## 2024-11-15 RX ORDER — METOPROLOL SUCCINATE 50 MG/1
50 TABLET, EXTENDED RELEASE ORAL DAILY
Qty: 90 TABLET | Refills: 1 | Status: SHIPPED | OUTPATIENT
Start: 2024-11-15

## 2024-11-15 NOTE — TELEPHONE ENCOUNTER
Yes, I was going to have him increase and take 2 of the 25 daily to equal 50    However he is probably going to run out too soon so lets give him the 50 mg extended release daily  Metoprolol  I sent him a new prescription for 90-day to his pharmacy thank you

## 2024-11-15 NOTE — TELEPHONE ENCOUNTER
Caller: [place] James    Relationship: Self    Best call back number: [phone]    What is the best time to reach you: ANY    Who are you requesting to speak with (clinical staff, provider,  specific staff member): PROVIDER    Do you know the name of the person who called:     What was the call regarding: PATIENT IS REQUESTING A CALL REGARDING HIS MEICATION metoprolol succinate XL (Toprol XL) 25 MG 24 hr tablet   . HE WOULD LIKE TO KNOW IF SHE SHOULD BE RECEIVING A HIGHER DOSAGE OF THE MEDICATION PLEASE CALL TO ADVISE.  Is it okay if the provider responds through MyChart: NO

## 2024-12-02 NOTE — PROGRESS NOTES
"Chief Complaint  Anxiety (Follow up )    Subjective        James Hilliard presents to [facility]  History of Present Illness  Follow-up uncontrolled hypertension.  Chronic anxiety obsessive behaviors to manage anxiety,  But the medication would help more, no adverse events, improving his health better choices.  No exertional chest pains occasional palpitations but no racing heart dizziness associated symptoms  Anxiety      Objective   Vital Signs:  /88 (BP Location: Left arm, Patient Position: Sitting, Cuff Size: Large Adult)   Pulse 70   Ht 175.3 cm (69\")   Wt 91.3 kg (201 lb 4.8 oz)   SpO2 96%   BMI 29.73 kg/m²   Estimated body mass index is 29.73 kg/m² as calculated from the following:    Height as of this encounter: 175.3 cm (69\").    Weight as of this encounter: 91.3 kg (201 lb 4.8 oz).            Physical Exam   Result Review :                Assessment and Plan   Diagnoses and all orders for this visit:    1. Obsessive-compulsive disorder, unspecified type (Primary)    2. Mixed hyperlipidemia    3. Primary hypertension    4. Palpitations  -     Ambulatory Referral to Cardiology    5. Encounter for screening for vascular disease  -     CT Cardiac Calcium Score Without Dye    Other orders  -     sertraline (Zoloft) 50 MG tablet; Take 1 tablet by mouth Daily.  Dispense: 30 tablet; Refill: 1             Follow Up   Return in about 3 months (around 2/11/2025) for Print discharge instructions/educational handouts.  Patient was given instructions and counseling regarding his condition or for health maintenance advice. Please see specific information pulled into the AVS if appropriate.     Patient Instructions     Increase metoprolol to 50 mg daily for better blood pressure control extended release update me your blood pressure next week at least 2 days and heart rate I will adjust it again likely      Make sure you are taking atorvastatin,  Try to break a sweat doing " something fun or some type of exercise daily  Avoid smoking try to wean off when able from nicotine but avoid smoking    Check blood pressure weekly should be less than 130/80 with a statin LDL less than 70  Try to lose 5 or 10 pounds over the next year break a sweat daily    Cardiology, for palpitations, just a good thorough checkup,  Likely will need a Holter monitor I will have cardiology get the ECG and Holter monitor    If racing heart chest pain shortness of breath syncope emergency room    Consider recommend outpatient CT calcium score cheaper [employer]  50 dollars       Otherwise as long as you are doing well and feeling good I will see you back in 3 months but update me next week on blood pressure readings,    Sertraline 50 mg 1/2 tablet every other day for a week 1 tablet daily thereafter update me  In 6 weeks your condition sooner for any difficulties or problems,    If you have difficulty with the medicine stop we will try something different or see psychiatry otherwise continue to make these good changes in your life, encourage you,              Doing some type of Kalpesh exercise excetra  This is how to lower risk of stroke and heart attack    Strongly encourage you to decrease alcohol, consider Alcoholics Anonymous, if you need naltrexone let me know    .  You change people places things when ready,

## 2024-12-09 ENCOUNTER — HOSPITAL ENCOUNTER (OUTPATIENT)
Dept: CT IMAGING | Facility: HOSPITAL | Age: 49
Discharge: HOME | End: 2024-12-09
Admitting: NURSE PRACTITIONER

## 2024-12-09 PROCEDURE — 75571 CT HRT W/O DYE W/CA TEST: CPT

## 2024-12-12 ENCOUNTER — TELEPHONE (OUTPATIENT)
Dept: FAMILY MEDICINE CLINIC | Facility: CLINIC | Age: 49
End: 2024-12-12

## 2024-12-12 NOTE — TELEPHONE ENCOUNTER
Neither 1 are considered high doses,    Metoprolol maximum dose of 200    Sertraline maximum dose is 200    So he is only at 25% of maximum each, these are pretty much standard doses just 1 make sure he is happy with this and doing well thank you

## 2024-12-12 NOTE — TELEPHONE ENCOUNTER
Spoke with pt about meds. He is taking metoprolol 50 mg and Sertraline 50 mg. He states he is fine but was wondering if the dose was too much after speaking with family about it. He was wanting to make sure it wasn't harmful to him

## 2024-12-12 NOTE — TELEPHONE ENCOUNTER
Please clarify patient's question recently started on sertraline slow titration   As well as some blood pressure medication but    Clarify how he is doing with the medication what is the dose how he feels  Which medication what he needs thank you

## 2024-12-12 NOTE — TELEPHONE ENCOUNTER
Pt had questions concerning the recent meds he was prescribed being to too high for dosage. Please advise

## 2025-01-24 NOTE — TELEPHONE ENCOUNTER
Rx Refill Note  Requested Prescriptions     Pending Prescriptions Disp Refills    sertraline (ZOLOFT) 50 MG tablet [Pharmacy Med Name: Sertraline HCl 50 MG Oral Tablet] 30 tablet 0     Sig: Take 1 tablet by mouth once daily      Last office visit with prescribing clinician: 11/11/2024   Last telemedicine visit with prescribing clinician: Visit date not found   Next office visit with prescribing clinician: 2/11/2025                         Would you like a call back once the refill request has been completed: [] Yes [] No    If the office needs to give you a call back, can they leave a voicemail: [] Yes [] No    Nelda Reid MA  01/24/25, 15:59 EST

## 2025-02-18 ENCOUNTER — OFFICE VISIT (OUTPATIENT)
Dept: CARDIOLOGY | Facility: CLINIC | Age: 50
End: 2025-02-18
Payer: COMMERCIAL

## 2025-02-18 ENCOUNTER — LAB (OUTPATIENT)
Dept: LAB | Facility: HOSPITAL | Age: 50
End: 2025-02-18
Payer: COMMERCIAL

## 2025-02-18 VITALS
BODY MASS INDEX: 30.01 KG/M2 | SYSTOLIC BLOOD PRESSURE: 128 MMHG | DIASTOLIC BLOOD PRESSURE: 82 MMHG | HEIGHT: 69 IN | HEART RATE: 69 BPM | WEIGHT: 202.6 LBS

## 2025-02-18 DIAGNOSIS — R73.03 PREDIABETES: ICD-10-CM

## 2025-02-18 DIAGNOSIS — F10.10 ALCOHOL ABUSE: Primary | ICD-10-CM

## 2025-02-18 DIAGNOSIS — F41.9 ANXIETY: ICD-10-CM

## 2025-02-18 DIAGNOSIS — Z72.0 TOBACCO ABUSE: ICD-10-CM

## 2025-02-18 DIAGNOSIS — E78.2 MIXED HYPERLIPIDEMIA: ICD-10-CM

## 2025-02-18 DIAGNOSIS — E66.3 OVERWEIGHT WITH BODY MASS INDEX (BMI) OF 29 TO 29.9 IN ADULT: ICD-10-CM

## 2025-02-18 DIAGNOSIS — R00.2 PALPITATION: ICD-10-CM

## 2025-02-18 LAB
ANION GAP SERPL CALCULATED.3IONS-SCNC: 9.6 MMOL/L (ref 5–15)
BUN SERPL-MCNC: 11 MG/DL (ref 6–20)
BUN/CREAT SERPL: 11.3 (ref 7–25)
CALCIUM SPEC-SCNC: 9.3 MG/DL (ref 8.6–10.5)
CHLORIDE SERPL-SCNC: 104 MMOL/L (ref 98–107)
CHOLEST SERPL-MCNC: 180 MG/DL (ref 0–200)
CO2 SERPL-SCNC: 26.4 MMOL/L (ref 22–29)
CREAT SERPL-MCNC: 0.97 MG/DL (ref 0.76–1.27)
EGFRCR SERPLBLD CKD-EPI 2021: 95.7 ML/MIN/1.73
GLUCOSE SERPL-MCNC: 107 MG/DL (ref 65–99)
HBA1C MFR BLD: 6 % (ref 4.8–5.6)
HDLC SERPL-MCNC: 35 MG/DL (ref 40–60)
LDLC SERPL CALC-MCNC: 85 MG/DL (ref 0–100)
LDLC/HDLC SERPL: 2.06 {RATIO}
POTASSIUM SERPL-SCNC: 4.1 MMOL/L (ref 3.5–5.2)
SODIUM SERPL-SCNC: 140 MMOL/L (ref 136–145)
TRIGL SERPL-MCNC: 364 MG/DL (ref 0–150)
VLDLC SERPL-MCNC: 60 MG/DL (ref 5–40)

## 2025-02-18 PROCEDURE — 83036 HEMOGLOBIN GLYCOSYLATED A1C: CPT

## 2025-02-18 PROCEDURE — 83695 ASSAY OF LIPOPROTEIN(A): CPT

## 2025-02-18 PROCEDURE — 36415 COLL VENOUS BLD VENIPUNCTURE: CPT

## 2025-02-18 PROCEDURE — 93000 ELECTROCARDIOGRAM COMPLETE: CPT | Performed by: INTERNAL MEDICINE

## 2025-02-18 PROCEDURE — 80048 BASIC METABOLIC PNL TOTAL CA: CPT

## 2025-02-18 PROCEDURE — 99204 OFFICE O/P NEW MOD 45 MIN: CPT | Performed by: INTERNAL MEDICINE

## 2025-02-18 PROCEDURE — 80061 LIPID PANEL: CPT

## 2025-02-18 NOTE — PROGRESS NOTES
CARDIOLOGY    Dioni Arita MD    ENCOUNTER DATE:  25      James Hilliard / 49 y.o. / male        CHIEF COMPLAINT / REASON FOR OFFICE VISIT     Palpitations (NEW PATIENT )      HISTORY OF PRESENT ILLNESS       HPI    James Hilliard is a 49 y.o. male with coronary atherosclerosis, hyperlipidemia, asthma, alcohol and tobacco abuse, overweight who presents to [facility] care as new patient.    CT coronary calcium score last year 2024 was mildly elevated at 28.     Today, patient has no acute complaints. Pt reports intermittent palpitation most of his life. Reports it to occur 1-2 x per week, lasting a few seconds and feels a bit short of breath. 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 Denies chest pain, dyspnea on exertion, leg edema, orthopnea, PND. Occasional dizziness, denies syncope or unexpected falls. Some intermittent blood in the stool which he attributes to hemorrhoids. Pt does snore a lot during sleep. Does not recall having had a heart attack or stroke. Family history is notable for paternal grandfather who  of MI in early 60s and paternal uncle who  of MI at 55. Former smoker, 1 ppd x 30 years and quit 8 months ago but he vapes daily. Consumes 2-3 drinks twice per week which he has cut down a lot, denies substance.     REVIEW OF SYSTEMS     Review of Systems   Constitutional: Negative for weight loss.   Cardiovascular:  Positive for palpitations. Negative for chest pain, dyspnea on exertion, leg swelling, orthopnea, paroxysmal nocturnal dyspnea and syncope.   Respiratory:  Positive for shortness of breath and snoring.    Hematologic/Lymphatic: Negative for bleeding problem.   Musculoskeletal:  Negative for falls.   Gastrointestinal:  Negative for hematochezia and melena.   Genitourinary:  Negative for hematuria.   Neurological:  Negative for dizziness and light-headedness.   Psychiatric/Behavioral:  Negative  "for altered mental status.            MEDICATIONS      Outpatient Encounter Medications as of 2/18/2025   Medication Sig Dispense Refill    albuterol sulfate  (90 Base) MCG/ACT inhaler Inhale 2 puffs Every 4 (Four) Hours As Needed for Wheezing. 6.7 g 3    atorvastatin (Lipitor) 40 MG tablet Take 1 tablet by mouth Daily. 90 tablet 3    atorvastatin (Lipitor) 40 MG tablet Take 1 tablet by mouth Daily. For good cardiac health 90 tablet 3    budesonide-formoterol (Symbicort) 160-4.5 MCG/ACT inhaler Inhale 2 puffs 2 (Two) Times a Day. 3 each 3    ibuprofen (ADVIL,MOTRIN) 400 MG tablet Take 0.5 tablets by mouth.      icosapent ethyl (VASCEPA) 1 g capsule capsule Take 2 g by mouth 2 (Two) Times a Day With Meals. For healthy heart and liver 360 capsule 3    metoprolol succinate XL (Toprol XL) 50 MG 24 hr tablet Take 1 tablet by mouth Daily. For blood pressure and good heart health 90 tablet 1    sertraline (ZOLOFT) 50 MG tablet Take 1 tablet by mouth once daily 30 tablet 2    sildenafil (REVATIO) 20 MG tablet Take 1 tablet by mouth Daily.      Semaglutide-Weight Management (Wegovy) 0.25 MG/0.5ML solution auto-injector Inject 0.5 mL under the skin into the appropriate area as directed 1 (One) Time Per Week. (Patient not taking: Reported on 2/18/2025) 2 mL 0     No facility-administered encounter medications on file as of 2/18/2025.         VITAL SIGNS     Visit Vitals  /82   Pulse 69   Ht 175.3 cm (69.02\")   Wt 91.9 kg (202 lb 9.6 oz)   BMI 29.90 kg/m²         Wt Readings from Last 3 Encounters:   02/18/25 91.9 kg (202 lb 9.6 oz)   11/11/24 91.3 kg (201 lb 4.8 oz)   09/30/24 89 kg (196 lb 3.2 oz)     Body mass index is 29.9 kg/m².      PHYSICAL EXAMINATION     Vitals and nursing note reviewed.   Constitutional:       Appearance: Healthy appearance. Not in distress.   Neck:      Vascular: No JVR.   Pulmonary:      Effort: Pulmonary effort is normal.      Breath sounds: Normal breath sounds. No wheezing. No " rhonchi. No rales.   Cardiovascular:      Normal rate. Regular rhythm.      Murmurs: There is no murmur.   Edema:     Peripheral edema absent.   Abdominal:      General: There is no distension.      Palpations: Abdomen is soft.      Tenderness: There is no abdominal tenderness.   Musculoskeletal:      Cervical back: Neck supple. Skin:     General: Skin is cool.   Neurological:      Mental Status: Alert and oriented to person, place and time.         REVIEWED DATA       ECG 12 Lead    Date/Time: 2/18/2025 8:45 AM  Performed by: Dioni Arita MD    Authorized by: Dioni Arita MD  Previous ECG: no previous ECG available  Rhythm: sinus rhythm  Other findings: early repolarization    Clinical impression: normal ECG        Lab Results   Component Value Date    WBC 6.95 08/14/2024    HGB 17.8 (H) 08/14/2024    HCT 50.8 08/14/2024    MCV 89.8 08/14/2024     08/14/2024       Lab Results   Component Value Date    HGBA1C 5.70 (H) 08/14/2024       Lab Results   Component Value Date    GLUCOSE 101 (H) 08/14/2024    BUN 13 08/14/2024    CREATININE 0.91 08/14/2024    EGFR 104.0 08/14/2024    BCR 14.3 08/14/2024    K 5.7 (H) 08/14/2024    CO2 26.3 08/14/2024    CALCIUM 9.2 08/14/2024    ALBUMIN 4.5 08/14/2024    BILITOT 0.5 08/14/2024    AST 38 08/14/2024    ALT 63 (H) 08/14/2024       Lab Results   Component Value Date    CHLPL 215 (H) 08/14/2024    TRIG 329 (H) 08/14/2024    HDL 32 (L) 08/14/2024     (H) 08/14/2024             ASSESSMENT & PLAN     Diagnoses and all orders for this visit:    1. Alcohol abuse (Primary)  Overview:  3 days a week at home responsible      2. Anxiety    3. Mixed hyperlipidemia    4. Overweight with body mass index (BMI) of 29 to 29.9 in adult    5. Prediabetes    6. Tobacco abuse       Palpitation: Patient reports longstanding history of intermittent palpitation likely due to PVCs.  ECG in office today showed NSR with early repolarization. On Toprol XL 50 daily since last year which has  helped.  Will further evaluate with outpatient echo and 3-day Zio patch to rule out significant arrhythmia and structural/functional abnormalities.  Risk factor modification including lipid control and weight loss, alcohol moderation, see below.  Coronary atherosclerosis/hyperlipidemia: CT coronary calcium score last year 12/2024 was mildly elevated at 28.  Lipids last year 8/2024 showed HDL 32, , total cholesterol 215, not well-controlled.  On atorvastatin 40 daily for >10 years but he did stop for 3 years before resuming it, will repeat lipids along LP(a) to help guide therapy.  Prediabetes: Hemoglobin A1c 5.7 in 8/2024, will repeat today.  Alcohol and tobacco abuse: Patient previously drank heavily but has cut down.  He also smoked 1 pack/day for 30 years, fortunately he has quit smoking but unfortunately has resumed vaping.  Strongly encourage quitting, at least cutting back.  Anxiety: Management per primary team and other specialists.  Obesity/overweight: BMI 30, prescribed Wegovy but patient is unable to be approved.  Encourage patient to be discussed with PCP given the abnormal calcium score which may assist with approval.  ?ESTEBAN: STOP BANG score 3, high risk.  Patient agreeable to sleep medicine referral.    I spent 36 minutes caring for James on this date of service. This time includes time spent by me in the following activities: preparing for the visit, reviewing tests, performing a medically appropriate examination and/or evaluation, counseling and educating the patient/family/caregiver, and referring and communicating with other health care professionals.     Additionally, I am providing longitudinal care which includes continuously being a focal point for all of the patient's health care services and ongoing medical care related to palpitation and hyperlipidemia as documented above.    Dioni Arita MD. PhD. FACC  02/18/25  08:16 EST    Part of this note may be an electronic  transcription/translation of spoken language to printed text using the Dragon dictation system.

## 2025-02-19 ENCOUNTER — TELEPHONE (OUTPATIENT)
Dept: FAMILY MEDICINE CLINIC | Facility: CLINIC | Age: 50
End: 2025-02-19
Payer: COMMERCIAL

## 2025-02-19 ENCOUNTER — TELEPHONE (OUTPATIENT)
Dept: CARDIOLOGY | Facility: CLINIC | Age: 50
End: 2025-02-19
Payer: COMMERCIAL

## 2025-02-19 PROBLEM — F42.2 MIXED OBSESSIONAL THOUGHTS AND ACTS: Status: ACTIVE | Noted: 2025-02-19

## 2025-02-19 LAB — LPA SERPL-SCNC: 165.4 NMOL/L

## 2025-02-19 NOTE — TELEPHONE ENCOUNTER
IC from [facility] stating patient returning call from [place]. Patient was confused as to why needing labs again when just competed with cardiologist. Informed patient a separate message was sent back to provider regarding labs but informed patient needing to schedule a PHYSICAL. Patient declined at this time and stated will callback to schedule physical.

## 2025-02-19 NOTE — TELEPHONE ENCOUNTER
Permission for the hub to transfer this call directly to the nurse triage line at [facility].    Attempted to call James Hilliard, no answer.  Left a voicemail for patient to call back and ask to speak with the triage nurses.  Will continue to try to reach patient.    Cynthia Reyes RN  [facility] Triage  02/19/25 15:28 EST

## 2025-02-19 NOTE — TELEPHONE ENCOUNTER
HUB RELAY:    Left  pt has over due labs but another provider just ordered them for him - sent a separate mssg to make sure we can use those results - pt needs appt jeff/ epley for a physical please vincent if he calls back

## 2025-02-19 NOTE — TELEPHONE ENCOUNTER
Please call patient recently we started him on sertraline,  For anxiety and OCD  Just want to know if he is taking the medicine if so how is he doing, is not helping symptoms?  If not did he have a problem or would like to try an alternative medicine let me know happy to help    Thank you

## 2025-02-19 NOTE — TELEPHONE ENCOUNTER
I spoke with James Hilliard and updated pt on results/recommendations from provider.  Pt verbalized understanding and has no further questions at this time.    Thank you,    Iman TREVINO, RN  Triage [facility]  02/19/25 15:32 EST

## 2025-02-19 NOTE — TELEPHONE ENCOUNTER
----- Message from Gayla CONLEY sent at 2/19/2025  8:08 AM EST -----  Active labs noted in chart never completed. Please contact and vincent. Need to vincent for f/u with PCP. Can use next CPE date.  ----- Message -----  From: SYSTEM  Sent: 11/4/2024   1:15 AM EST  To: Fidel Butler [facility]

## 2025-02-19 NOTE — TELEPHONE ENCOUNTER
----- Message from Dioni Arita sent at 2/19/2025  3:22 PM EST -----  BMET stable, hyperkalemia has resolved, A1c again shows prediabetes and a bit worse than prior, Lipids improved after better compliance with atorvastatin but LP(a) very elevated suggesting higher long-term cardiovascular risk.  Recommend increasing atorvastatin to 80 daily for more aggressive risk reduction.  CCing PCP James Epley as update.

## 2025-02-24 NOTE — TELEPHONE ENCOUNTER
Spoke with PT. PT started taking Sertraline. He said it is going ok. Hasn't noticed any significant change yet.

## 2025-02-25 ENCOUNTER — HOSPITAL ENCOUNTER (OUTPATIENT)
Dept: CARDIOLOGY | Facility: HOSPITAL | Age: 50
Discharge: HOME | End: 2025-02-25
Admitting: INTERNAL MEDICINE
Payer: COMMERCIAL

## 2025-02-25 VITALS
HEIGHT: 69 IN | WEIGHT: 202 LBS | HEART RATE: 64 BPM | DIASTOLIC BLOOD PRESSURE: 84 MMHG | SYSTOLIC BLOOD PRESSURE: 120 MMHG | BODY MASS INDEX: 29.92 KG/M2

## 2025-02-25 DIAGNOSIS — E78.2 MIXED HYPERLIPIDEMIA: ICD-10-CM

## 2025-02-25 DIAGNOSIS — Z72.0 TOBACCO ABUSE: ICD-10-CM

## 2025-02-25 DIAGNOSIS — R73.03 PREDIABETES: ICD-10-CM

## 2025-02-25 LAB
AORTIC ARCH: 2.4 CM
ASCENDING AORTA: 3.2 CM
AV MEAN PRESS GRAD SYS DOP V1V2: 4 MMHG
AV VMAX SYS DOP: 143 CM/SEC
BH CV ECHO MEAS - ACS: 2.25 CM
BH CV ECHO MEAS - AO MAX PG: 8.2 MMHG
BH CV ECHO MEAS - AO ROOT DIAM: 3.2 CM
BH CV ECHO MEAS - AO V2 VTI: 30.2 CM
BH CV ECHO MEAS - AVA(I,D): 2.06 CM2
BH CV ECHO MEAS - EDV(CUBED): 97.3 ML
BH CV ECHO MEAS - EDV(MOD-SP2): 105 ML
BH CV ECHO MEAS - EDV(MOD-SP4): 142 ML
BH CV ECHO MEAS - EF(MOD-SP2): 64.8 %
BH CV ECHO MEAS - EF(MOD-SP4): 66.9 %
BH CV ECHO MEAS - ESV(CUBED): 20.4 ML
BH CV ECHO MEAS - ESV(MOD-SP2): 37 ML
BH CV ECHO MEAS - ESV(MOD-SP4): 47 ML
BH CV ECHO MEAS - FS: 40.6 %
BH CV ECHO MEAS - IVS/LVPW: 0.9 CM
BH CV ECHO MEAS - IVSD: 0.9 CM
BH CV ECHO MEAS - LAT PEAK E' VEL: 12 CM/SEC
BH CV ECHO MEAS - LV DIASTOLIC VOL/BSA (35-75): 67.5 CM2
BH CV ECHO MEAS - LV MASS(C)D: 148.1 GRAMS
BH CV ECHO MEAS - LV MAX PG: 3.1 MMHG
BH CV ECHO MEAS - LV MEAN PG: 2 MMHG
BH CV ECHO MEAS - LV SYSTOLIC VOL/BSA (12-30): 22.3 CM2
BH CV ECHO MEAS - LV V1 MAX: 88 CM/SEC
BH CV ECHO MEAS - LV V1 VTI: 19.3 CM
BH CV ECHO MEAS - LVIDD: 4.6 CM
BH CV ECHO MEAS - LVIDS: 2.7 CM
BH CV ECHO MEAS - LVOT AREA: 3.2 CM2
BH CV ECHO MEAS - LVOT DIAM: 2.02 CM
BH CV ECHO MEAS - LVPWD: 1 CM
BH CV ECHO MEAS - MED PEAK E' VEL: 8.7 CM/SEC
BH CV ECHO MEAS - MR MAX PG: 33.8 MMHG
BH CV ECHO MEAS - MR MAX VEL: 290.9 CM/SEC
BH CV ECHO MEAS - MV A DUR: 0.11 SEC
BH CV ECHO MEAS - MV A MAX VEL: 68.7 CM/SEC
BH CV ECHO MEAS - MV DEC SLOPE: 250.7 CM/SEC2
BH CV ECHO MEAS - MV DEC TIME: 0.23 SEC
BH CV ECHO MEAS - MV E MAX VEL: 67.1 CM/SEC
BH CV ECHO MEAS - MV E/A: 0.98
BH CV ECHO MEAS - MV MAX PG: 2.8 MMHG
BH CV ECHO MEAS - MV MEAN PG: 1.43 MMHG
BH CV ECHO MEAS - MV P1/2T: 91.6 MSEC
BH CV ECHO MEAS - MV V2 VTI: 29.7 CM
BH CV ECHO MEAS - MVA(P1/2T): 2.4 CM2
BH CV ECHO MEAS - MVA(VTI): 2.09 CM2
BH CV ECHO MEAS - PA ACC TIME: 0.09 SEC
BH CV ECHO MEAS - PA V2 MAX: 101.2 CM/SEC
BH CV ECHO MEAS - PI END-D VEL: 98.4 CM/SEC
BH CV ECHO MEAS - PULM A REVS DUR: 0.11 SEC
BH CV ECHO MEAS - PULM A REVS VEL: 23.7 CM/SEC
BH CV ECHO MEAS - PULM DIAS VEL: 50.2 CM/SEC
BH CV ECHO MEAS - PULM S/D: 1
BH CV ECHO MEAS - PULM SYS VEL: 50.2 CM/SEC
BH CV ECHO MEAS - QP/QS: 0.89
BH CV ECHO MEAS - RAP SYSTOLE: 3 MMHG
BH CV ECHO MEAS - RV MAX PG: 3.6 MMHG
BH CV ECHO MEAS - RV V1 MAX: 94.3 CM/SEC
BH CV ECHO MEAS - RV V1 VTI: 18.2 CM
BH CV ECHO MEAS - RVOT DIAM: 1.97 CM
BH CV ECHO MEAS - RVSP: 13.9 MMHG
BH CV ECHO MEAS - SUP REN AO DIAM: 2.1 CM
BH CV ECHO MEAS - SV(LVOT): 62.1 ML
BH CV ECHO MEAS - SV(MOD-SP2): 68 ML
BH CV ECHO MEAS - SV(MOD-SP4): 95 ML
BH CV ECHO MEAS - SV(RVOT): 55.5 ML
BH CV ECHO MEAS - SVI(LVOT): 29.5 ML/M2
BH CV ECHO MEAS - SVI(MOD-SP2): 32.3 ML/M2
BH CV ECHO MEAS - SVI(MOD-SP4): 45.1 ML/M2
BH CV ECHO MEAS - TAPSE (>1.6): 2.09 CM
BH CV ECHO MEAS - TR MAX PG: 10.9 MMHG
BH CV ECHO MEAS - TR MAX VEL: 165.4 CM/SEC
BH CV ECHO MEASUREMENTS AVERAGE E/E' RATIO: 6.48
BH CV XLRA - RV BASE: 2.7 CM
BH CV XLRA - RV LENGTH: 8.5 CM
BH CV XLRA - RV MID: 2.36 CM
BH CV XLRA - TDI S': 14.1 CM/SEC
LEFT ATRIUM VOLUME INDEX: 20.4 ML/M2
LV EF BIPLANE MOD: 66.4 %
SINUS: 2.8 CM
STJ: 2.8 CM

## 2025-02-25 PROCEDURE — 93306 TTE W/DOPPLER COMPLETE: CPT | Performed by: INTERNAL MEDICINE

## 2025-02-25 PROCEDURE — 93306 TTE W/DOPPLER COMPLETE: CPT

## 2025-02-26 RX ORDER — SERTRALINE HYDROCHLORIDE 100 MG/1
100 TABLET, FILM COATED ORAL DAILY
Qty: 30 TABLET | Refills: 3 | Status: SHIPPED | OUTPATIENT
Start: 2025-02-26

## 2025-03-12 ENCOUNTER — HOSPITAL ENCOUNTER (EMERGENCY)
Facility: HOSPITAL | Age: 50
Discharge: HOME | End: 2025-03-12
Attending: EMERGENCY MEDICINE
Payer: COMMERCIAL

## 2025-03-12 ENCOUNTER — TELEPHONE (OUTPATIENT)
Dept: FAMILY MEDICINE CLINIC | Facility: CLINIC | Age: 50
End: 2025-03-12

## 2025-03-12 VITALS
HEART RATE: 66 BPM | RESPIRATION RATE: 17 BRPM | HEIGHT: 69 IN | OXYGEN SATURATION: 96 % | TEMPERATURE: 98.2 F | WEIGHT: 200 LBS | SYSTOLIC BLOOD PRESSURE: 137 MMHG | DIASTOLIC BLOOD PRESSURE: 104 MMHG | BODY MASS INDEX: 29.62 KG/M2

## 2025-03-12 DIAGNOSIS — R42 POSITIONAL LIGHTHEADEDNESS: Primary | ICD-10-CM

## 2025-03-12 LAB
ALBUMIN SERPL-MCNC: 4.5 G/DL (ref 3.5–5.2)
ALBUMIN/GLOB SERPL: 2 G/DL
ALP SERPL-CCNC: 118 U/L (ref 39–117)
ALT SERPL W P-5'-P-CCNC: 82 U/L (ref 1–41)
ANION GAP SERPL CALCULATED.3IONS-SCNC: 11.2 MMOL/L (ref 5–15)
AST SERPL-CCNC: 50 U/L (ref 1–40)
BASOPHILS # BLD AUTO: 0.03 10*3/MM3 (ref 0–0.2)
BASOPHILS NFR BLD AUTO: 0.5 % (ref 0–1.5)
BILIRUB SERPL-MCNC: 0.6 MG/DL (ref 0–1.2)
BUN SERPL-MCNC: 9 MG/DL (ref 6–20)
BUN/CREAT SERPL: 11 (ref 7–25)
CALCIUM SPEC-SCNC: 9.2 MG/DL (ref 8.6–10.5)
CHLORIDE SERPL-SCNC: 104 MMOL/L (ref 98–107)
CO2 SERPL-SCNC: 25.8 MMOL/L (ref 22–29)
CREAT SERPL-MCNC: 0.82 MG/DL (ref 0.76–1.27)
D DIMER PPP FEU-MCNC: 0.17 MCGFEU/ML (ref 0–0.5)
DEPRECATED RDW RBC AUTO: 40.6 FL (ref 37–54)
EGFRCR SERPLBLD CKD-EPI 2021: 107.7 ML/MIN/1.73
EOSINOPHIL # BLD AUTO: 0.11 10*3/MM3 (ref 0–0.4)
EOSINOPHIL NFR BLD AUTO: 2 % (ref 0.3–6.2)
ERYTHROCYTE [DISTWIDTH] IN BLOOD BY AUTOMATED COUNT: 12.4 % (ref 12.3–15.4)
GEN 5 1HR TROPONIN T REFLEX: 8 NG/L
GLOBULIN UR ELPH-MCNC: 2.2 GM/DL
GLUCOSE SERPL-MCNC: 88 MG/DL (ref 65–99)
HCT VFR BLD AUTO: 47.7 % (ref 37.5–51)
HGB BLD-MCNC: 16.2 G/DL (ref 13–17.7)
IMM GRANULOCYTES # BLD AUTO: 0.02 10*3/MM3 (ref 0–0.05)
IMM GRANULOCYTES NFR BLD AUTO: 0.4 % (ref 0–0.5)
LYMPHOCYTES # BLD AUTO: 2.18 10*3/MM3 (ref 0.7–3.1)
LYMPHOCYTES NFR BLD AUTO: 38.9 % (ref 19.6–45.3)
MCH RBC QN AUTO: 29.9 PG (ref 26.6–33)
MCHC RBC AUTO-ENTMCNC: 34 G/DL (ref 31.5–35.7)
MCV RBC AUTO: 88.2 FL (ref 79–97)
MONOCYTES # BLD AUTO: 0.37 10*3/MM3 (ref 0.1–0.9)
MONOCYTES NFR BLD AUTO: 6.6 % (ref 5–12)
NEUTROPHILS NFR BLD AUTO: 2.89 10*3/MM3 (ref 1.7–7)
NEUTROPHILS NFR BLD AUTO: 51.6 % (ref 42.7–76)
PLATELET # BLD AUTO: 194 10*3/MM3 (ref 140–450)
PMV BLD AUTO: 10.2 FL (ref 6–12)
POTASSIUM SERPL-SCNC: 3.9 MMOL/L (ref 3.5–5.2)
PROT SERPL-MCNC: 6.7 G/DL (ref 6–8.5)
QT INTERVAL: 403 MS
QTC INTERVAL: 418 MS
RBC # BLD AUTO: 5.41 10*6/MM3 (ref 4.14–5.8)
SODIUM SERPL-SCNC: 141 MMOL/L (ref 136–145)
TROPONIN T NUMERIC DELTA: 0 NG/L
TROPONIN T SERPL HS-MCNC: 8 NG/L
WBC NRBC COR # BLD AUTO: 5.6 10*3/MM3 (ref 3.4–10.8)

## 2025-03-12 PROCEDURE — 85025 COMPLETE CBC W/AUTO DIFF WBC: CPT | Performed by: EMERGENCY MEDICINE

## 2025-03-12 PROCEDURE — 85379 FIBRIN DEGRADATION QUANT: CPT | Performed by: EMERGENCY MEDICINE

## 2025-03-12 PROCEDURE — 84484 ASSAY OF TROPONIN QUANT: CPT | Performed by: EMERGENCY MEDICINE

## 2025-03-12 PROCEDURE — 93010 ELECTROCARDIOGRAM REPORT: CPT | Performed by: INTERNAL MEDICINE

## 2025-03-12 PROCEDURE — 36415 COLL VENOUS BLD VENIPUNCTURE: CPT

## 2025-03-12 PROCEDURE — 80053 COMPREHEN METABOLIC PANEL: CPT | Performed by: EMERGENCY MEDICINE

## 2025-03-12 PROCEDURE — 93005 ELECTROCARDIOGRAM TRACING: CPT | Performed by: EMERGENCY MEDICINE

## 2025-03-12 PROCEDURE — 99283 EMERGENCY DEPT VISIT LOW MDM: CPT | Performed by: EMERGENCY MEDICINE

## 2025-03-12 PROCEDURE — 99283 EMERGENCY DEPT VISIT LOW MDM: CPT

## 2025-03-12 NOTE — FSED PROVIDER NOTE
Subjective   History of Present Illness  50 yo male with lightheadedness (not spinning) for 2 weeks when he stands up and not when seated. His statin was doubled two weeks ago and his Zoloft was no longer covered by insurance and he has been out of it for two weeks; he is not sure if either of those are a cause of his symptoms. He noted elevated BP earlier today on a machine he borrowed from a neighbor and is not sure if it is correctly calibrated.  He has a past medical history of palpitations which his new his doctor sent him to a cardiologist for and he had 3 days of a monitor that he wore and it was never picked up on that so they still do not know what kind of palpitations he is having.  He does not get lightheaded with the palpitations is never been a problem with him whether sitting or standing.  He is not sure what is going on but has been persistent last past 2 weeks          Review of Systems    History reviewed. No pertinent past medical history.    No Known Allergies    History reviewed. No pertinent surgical history.    History reviewed. No pertinent family history.    Social History     Socioeconomic History    Marital status:    Tobacco Use    Smoking status: Former     Current packs/day: 1.00     Average packs/day: 1 pack/day for 33.2 years (33.2 ttl pk-yrs)     Types: Cigarettes     Start date: 1992     Passive exposure: Past    Smokeless tobacco: Former    Tobacco comments:     2/18/25QUIT CIGARETTES ABOUT 8 MONTH AGO    Vaping Use    Vaping status: Some Days    Substances: Nicotine    Devices: Refillable tank   Substance and Sexual Activity    Alcohol use: Yes    Drug use: No    Sexual activity: Defer           Objective   Physical Exam  Vitals and nursing note reviewed.   Constitutional:       General: He is not in acute distress.     Appearance: Normal appearance. He is normal weight. He is not ill-appearing or toxic-appearing.   HENT:      Mouth/Throat:      Mouth: Mucous membranes are  moist.      Pharynx: Oropharynx is clear.   Eyes:      Extraocular Movements: Extraocular movements intact.      Conjunctiva/sclera: Conjunctivae normal.   Cardiovascular:      Rate and Rhythm: Normal rate and regular rhythm.      Pulses: Normal pulses.      Heart sounds: Normal heart sounds. No murmur heard.  Pulmonary:      Effort: Pulmonary effort is normal. No respiratory distress.      Breath sounds: Normal breath sounds. No stridor. No wheezing, rhonchi or rales.   Musculoskeletal:         General: Normal range of motion.      Cervical back: Normal range of motion and neck supple.   Skin:     General: Skin is warm and dry.      Capillary Refill: Capillary refill takes less than 2 seconds.   Neurological:      General: No focal deficit present.      Mental Status: He is alert and oriented to person, place, and time. Mental status is at baseline.   Psychiatric:         Mood and Affect: Mood normal.         Behavior: Behavior normal.         Thought Content: Thought content normal.         Judgment: Judgment normal.         Procedures           ED Course  ED Course as of 03/12/25 1830   Wed Mar 12, 2025   1625 EKG normal sinus rhythm rate 65 EKG suggesting anterior infarct I do not see anything currently that looks concerning on EKG for that.  Normal QRS T waves intervals. [AR]   1625   Rate is 65. [AR]   1734 AST and ALT with AP are elevated [AR]   1738 Troponin is normal and D-dimer is normal 1 hour Trope pending [AR]   1820 EKG normal sinus rhythm rate 64 no STEMI no artifact or changes like the last EKG [AR]   1821 Troponin 0 [AR]      ED Course User Index  [AR] Reyna Jean MD                                           Medical Decision Making  Differential diagnosis of dizziness includes but not limited to posterior stroke, inner ear disorder, middle ear effusion with pressure, sinusitis,  central brainstem vestibular lesion, psychiatric disorder, benign positional vertigo, vestibular neuronitis,  migraine, hyperventilation, dehydration, arrhythmia, metabolic, electrolyte disorder.     Will get orthostatics and basic labs EKG     Review of history due to ALT AST and AP elevations, ETOH abuse; anxiety, obsessive compulsive d/o    Please keep apointment tomorrow. Your lab work except for AST ALT and alkaline phosphatase, which are related to the liver are elevated. The blood work x two for heart attack and EKG x 2 are normal. Your orthostatics were good. You are right, there are a number of possibilities for the symptoms of lightheadedness upon standing. Including medications.    He understood and agreed    Problems Addressed:  Positional lightheadedness: complicated acute illness or injury    Amount and/or Complexity of Data Reviewed  Labs: ordered. Decision-making details documented in ED Course.  ECG/medicine tests: ordered and independent interpretation performed. Decision-making details documented in ED Course.        Final diagnoses:   Positional lightheadedness       ED Disposition  ED Disposition       ED Disposition   Discharge    Condition   Stable    Comment   --               Epley, James, [place]  [address] [id number] [phone]      Keep apointment tomorrow.         Medication List      No changes were made to your prescriptions during this visit.

## 2025-03-12 NOTE — DISCHARGE INSTRUCTIONS
Please keep apointment tomorrow. Your lab work except for AST ALT and alkaline phosphatase, which are related to the liver are elevated. The blood work x two for heart attack and EKG x 2 are normal. Your orthostatics were good. You are right, there are a number of possibilities for the symptoms of lightheadedness upon standing. Including medications.

## 2025-03-12 NOTE — TELEPHONE ENCOUNTER
Hub staff attempted to follow warm transfer process and was unsuccessful     Caller: James Hilliard    Relationship to patient: Self    Best call back number: [phone]    Patient is needing: SAME DAY APPOINTMENT, DIZZINESS, LIGHT  HEADEDNESS

## 2025-03-13 ENCOUNTER — OFFICE VISIT (OUTPATIENT)
Dept: FAMILY MEDICINE CLINIC | Facility: CLINIC | Age: 50
End: 2025-03-13
Payer: COMMERCIAL

## 2025-03-13 VITALS
WEIGHT: 203.7 LBS | OXYGEN SATURATION: 98 % | DIASTOLIC BLOOD PRESSURE: 74 MMHG | SYSTOLIC BLOOD PRESSURE: 134 MMHG | HEART RATE: 96 BPM | BODY MASS INDEX: 30.17 KG/M2 | HEIGHT: 69 IN

## 2025-03-13 DIAGNOSIS — E78.2 MIXED HYPERLIPIDEMIA: ICD-10-CM

## 2025-03-13 DIAGNOSIS — R42 LIGHTHEADED: Primary | ICD-10-CM

## 2025-03-13 DIAGNOSIS — F41.9 ANXIETY: ICD-10-CM

## 2025-03-13 DIAGNOSIS — F19.939 WITHDRAWAL FROM OTHER PSYCHOACTIVE SUBSTANCE: ICD-10-CM

## 2025-03-13 LAB
QT INTERVAL: 404 MS
QTC INTERVAL: 417 MS

## 2025-03-13 PROCEDURE — 99214 OFFICE O/P EST MOD 30 MIN: CPT | Performed by: NURSE PRACTITIONER

## 2025-03-13 RX ORDER — ATORVASTATIN CALCIUM 80 MG/1
80 TABLET, FILM COATED ORAL DAILY
Qty: 90 TABLET | Refills: 3 | Status: SHIPPED | OUTPATIENT
Start: 2025-03-13

## 2025-03-13 RX ORDER — SERTRALINE HYDROCHLORIDE 100 MG/1
100 TABLET, FILM COATED ORAL DAILY
Qty: 30 TABLET | Refills: 3 | Status: SHIPPED | OUTPATIENT
Start: 2025-03-13

## 2025-03-13 NOTE — PATIENT INSTRUCTIONS
Discharge instructions    Resume sertraline  Go ahead and take 100 mg  Daily  Continue blood pressure medication  Update me your blood pressure next week  If your vertigo balance difficulty dizziness is not resolved in the next week or 2 I will refer you to ENT as well as physical therapy for balance and vertigo    Call your insurance company and see if they cover Wegovy for obesity  Zepbound for obesity    If you qualify for this, your BMI is 30  Comorbidity is hyperlipidemia, hypertension  If covered is your co-pay affordable, what you have to qualify for what you have to do    Plan would be to do the cover Qsymia?    If not there is other ways me and you can do a similar medication generic process      TOFRANIL  You could read about this  For OCD however  No need to complicate things presently  Lets stick with current plan  Other options available if needed      Important update me weekly for the next week or 2 until you are feeling better

## 2025-03-13 NOTE — PROGRESS NOTES
"Chief Complaint  Dizziness and Hypertension    Subjective        James Hilliard presents to [facility] PRIMARY CARE  History of Present Illness  Very pleasant gentleman here today has several issues, recently went to the pharmacy to get his Zoloft 50 mg daily for anxiety and some OCD, behaviors,  Insurance declined for some reason, it is generic not sure why  He is out of his medicine for some time a couple weeks and he is having some uneasy feeling in his head like lightheadedness or dizziness or possibly some slight off-balance sensation but no true vertigo or room spinning or a true balance difficulty he had no difficulty walking but he is just aware of this and it feels odd and uncomfortable he said some increased irritability  He feels better with his anxiety quite a bit with his Zoloft, he had no other issues and he would like to continue.  Saw cardiology and increased cholesterol medicine from 40-80, just 1 to make sure that would be a healthy thing and that he is not having issues with this,  Incidentally has elevated liver function test alkaline phosphatase and the you are tolerating to follow-up with this, we have discussed these issues previously  He is very motivated to improve his diabetes struggled his insurance declined Wegovy      Dizziness  Hypertension    Objective   Vital Signs:  /74 (BP Location: Right arm, Patient Position: Sitting, Cuff Size: Large Adult)   Pulse 96   Ht 175.3 cm (69.02\")   Wt 92.4 kg (203 lb 11.2 oz)   SpO2 98%   BMI 30.07 kg/m²   Estimated body mass index is 30.07 kg/m² as calculated from the following:    Height as of this encounter: 175.3 cm (69.02\").    Weight as of this encounter: 92.4 kg (203 lb 11.2 oz).            Physical Exam   Result Review :                Assessment and Plan   Diagnoses and all orders for this visit:    1. Lightheaded (Primary)    2. Anxiety    3. Mixed hyperlipidemia    4. Withdrawal from other psychoactive " substance  Comments:  Sudden withdrawal of sertraline beyond patient's control insurance declined feeling the prescription    Other orders  -     sertraline (Zoloft) 100 MG tablet; Take 1 tablet by mouth Daily.  Dispense: 30 tablet; Refill: 3  -     atorvastatin (Lipitor) 80 MG tablet; Take 1 tablet by mouth Daily.  Dispense: 90 tablet; Refill: 3             Follow Up   No follow-ups on file.  Patient was given instructions and counseling regarding his condition or for health maintenance advice. Please see specific information pulled into the AVS if appropriate.     Patient Instructions   Discharge instructions    Resume sertraline  Go ahead and take 100 mg  Daily  Continue blood pressure medication  Update me your blood pressure next week  If your vertigo balance difficulty dizziness is not resolved in the next week or 2 I will refer you to ENT as well as physical therapy for balance and vertigo

## 2025-04-11 ENCOUNTER — OFFICE VISIT (OUTPATIENT)
Dept: FAMILY MEDICINE CLINIC | Facility: CLINIC | Age: 50
End: 2025-04-11
Payer: COMMERCIAL

## 2025-04-11 VITALS
DIASTOLIC BLOOD PRESSURE: 85 MMHG | WEIGHT: 202 LBS | TEMPERATURE: 97.3 F | BODY MASS INDEX: 29.92 KG/M2 | HEART RATE: 72 BPM | OXYGEN SATURATION: 100 % | SYSTOLIC BLOOD PRESSURE: 141 MMHG | HEIGHT: 69 IN

## 2025-04-11 DIAGNOSIS — J30.1 SEASONAL ALLERGIC RHINITIS DUE TO POLLEN: Primary | ICD-10-CM

## 2025-04-11 PROCEDURE — 99214 OFFICE O/P EST MOD 30 MIN: CPT | Performed by: NURSE PRACTITIONER

## 2025-04-11 RX ORDER — METHYLPREDNISOLONE ACETATE 80 MG/ML
80 INJECTION, SUSPENSION INTRA-ARTICULAR; INTRALESIONAL; INTRAMUSCULAR; SOFT TISSUE ONCE
Status: COMPLETED | OUTPATIENT
Start: 2025-04-11 | End: 2025-04-11

## 2025-04-11 RX ADMIN — METHYLPREDNISOLONE ACETATE 80 MG: 80 INJECTION, SUSPENSION INTRA-ARTICULAR; INTRALESIONAL; INTRAMUSCULAR; SOFT TISSUE at 11:05

## 2025-04-11 NOTE — PATIENT INSTRUCTIONS
Discharge instructions Depo-Medrol injection today will help you this week and hopefully get on top of your allergies continue Zyrtec    If needed Flonase combination Astepro 1 spray each nostril twice daily    Opcon-A or similar antihistamine, advising like medication  Using this too frequently can call as rebound redness    A better medication if he needs something more for longer term several weeks would be Zaditor over-the-counter or Alaway daily for prevention  During this allergy season if need be it is over-the-counter  Fever chest pain shortness of breath emergency room  Update me next week if not feeling better

## 2025-04-11 NOTE — PROGRESS NOTES
"Chief Complaint  Headache and sinus pressure (Started this spring. Denies discolored drainage)    Subjective        James Hilliard presents to [facility]  History of Present Illness  Pleasant gentleman complains of increased sinus pressure, sneezing itchy eyes watery eyes allergies started about 4 weeks ago gets this seasonally does not feel like a cold he is not sore throat or no fever, steroid injection helps and he got 1 last year he would like the same dose 80 mg Depo-Medrol, nasal sprays does not work well and Zyrtec is not helping he has no chest pain or increased shortness of breath, he is working on his diet, making better choices even though he has not lost weight but he is still focused and not given.          Headache      Objective   Vital Signs:  /85 (BP Location: Right arm, Patient Position: Sitting, Cuff Size: Adult)   Pulse 72   Temp 97.3 °F (36.3 °C) (Temporal)   Ht 175.3 cm (69.02\")   Wt 91.6 kg (202 lb)   SpO2 100%   BMI 29.81 kg/m²   Estimated body mass index is 29.81 kg/m² as calculated from the following:    Height as of this encounter: 175.3 cm (69.02\").    Weight as of this encounter: 91.6 kg (202 lb).          Physical Exam  Constitutional:       General: He is not in acute distress.     Appearance: Normal appearance. He is not ill-appearing, toxic-appearing or diaphoretic.   HENT:      Nose: Nose normal. Congestion present.      Mouth/Throat:      Mouth: Mucous membranes are moist. Mucous membranes are dry.   Eyes:      Conjunctiva/sclera: Conjunctivae normal.      Pupils: Pupils are equal, round, and reactive to light.   Cardiovascular:      Rate and Rhythm: Normal rate and regular rhythm.   Pulmonary:      Effort: Pulmonary effort is normal. No respiratory distress.   Abdominal:      General: Abdomen is flat. Bowel sounds are normal.   Skin:     General: Skin is warm and dry.   Neurological:      General: No focal deficit present.      Mental " Status: Mental status is at baseline.   Psychiatric:         Mood and Affect: Mood normal.         Behavior: Behavior normal.         Thought Content: Thought content normal.         Judgment: Judgment normal.      Result Review :                Assessment and Plan   Diagnoses and all orders for this visit:    1. Seasonal allergic rhinitis due to pollen (Primary)             Follow Up   No follow-ups on file.  Patient was given instructions and counseling regarding his condition or for health maintenance advice. Please see specific information pulled into the AVS if appropriate.     There are no Patient Instructions on file for this visit.

## 2025-04-28 RX ORDER — SILDENAFIL CITRATE 20 MG/1
20 TABLET ORAL DAILY
Qty: 30 TABLET | Refills: 3 | Status: SHIPPED | OUTPATIENT
Start: 2025-04-28

## 2025-04-28 NOTE — TELEPHONE ENCOUNTER
Rx Refill Note  Requested Prescriptions     Pending Prescriptions Disp Refills    sildenafil (REVATIO) 20 MG tablet 30 tablet 1     Sig: Take 1 tablet by mouth Daily.      Last office visit with prescribing clinician: 4/11/2025   Last telemedicine visit with prescribing clinician: Visit date not found   Next office visit with prescribing clinician: Visit date not found                         Would you like a call back once the refill request has been completed: [] Yes [] No    If the office needs to give you a call back, can they leave a voicemail: [] Yes [] No    Nelda Reid MA  04/28/25, 09:55 EDT

## 2025-05-02 ENCOUNTER — TELEPHONE (OUTPATIENT)
Dept: FAMILY MEDICINE CLINIC | Facility: CLINIC | Age: 50
End: 2025-05-02
Payer: COMMERCIAL

## 2025-05-19 ENCOUNTER — OFFICE VISIT (OUTPATIENT)
Dept: FAMILY MEDICINE CLINIC | Facility: CLINIC | Age: 50
End: 2025-05-19
Payer: COMMERCIAL

## 2025-05-19 VITALS
HEART RATE: 63 BPM | BODY MASS INDEX: 29.62 KG/M2 | DIASTOLIC BLOOD PRESSURE: 83 MMHG | SYSTOLIC BLOOD PRESSURE: 125 MMHG | WEIGHT: 200 LBS | HEIGHT: 69 IN | TEMPERATURE: 98.2 F | OXYGEN SATURATION: 96 %

## 2025-05-19 DIAGNOSIS — R05.9 COUGH, UNSPECIFIED TYPE: ICD-10-CM

## 2025-05-19 DIAGNOSIS — J18.9 COMMUNITY ACQUIRED PNEUMONIA, UNSPECIFIED LATERALITY: Primary | ICD-10-CM

## 2025-05-19 PROCEDURE — 99214 OFFICE O/P EST MOD 30 MIN: CPT | Performed by: NURSE PRACTITIONER

## 2025-05-19 RX ORDER — ALBUTEROL SULFATE 90 UG/1
2 INHALANT RESPIRATORY (INHALATION) EVERY 4 HOURS PRN
Qty: 6.7 G | Refills: 3 | Status: SHIPPED | OUTPATIENT
Start: 2025-05-19

## 2025-05-19 RX ORDER — SERTRALINE HYDROCHLORIDE 100 MG/1
100 TABLET, FILM COATED ORAL DAILY
Qty: 90 TABLET | Refills: 1 | Status: SHIPPED | OUTPATIENT
Start: 2025-05-19

## 2025-05-19 RX ORDER — PREDNISONE 20 MG/1
20 TABLET ORAL DAILY
Qty: 7 TABLET | Refills: 0 | Status: SHIPPED | OUTPATIENT
Start: 2025-05-19 | End: 2025-05-26

## 2025-05-19 NOTE — PATIENT INSTRUCTIONS
Discharge instructions  Chest x-ray if you are not continue to feel better next week  But should you have shortness of breath fever frequent hemoptysis or severe hemoptysis blood in your sputum emergency room or any worsening otherwise you continue to improve  Update me weekly until everything is resolved    Should you have any recurrence of blood in your sputum, you need to see pulmonary for bronchoscopic and/or chest CT  Otherwise,  Prednisone 20 mg a day dose adjusted for 5 to 7 days less likely to have symptoms and certainly will help you breathe better continue sertraline you are doing great

## 2025-05-19 NOTE — PROGRESS NOTES
"Chief Complaint  walking pnumonia    Subjective        James Hilliard presents to [facility]  History of Present Illness  Very pleasant gentleman here today about 10 days ago developed fever cough congestion some shortness of breath he was diagnosed clinically with pneumonia prescribe Augmentin just finished it, it took a while but he is better but still has significant cough mostly at night lots of congestion in the morning   For several mornings he had some bloody sputum mixed, a couple times, but not throughout the day, he has had no recent fever presently without shortness of breath presently feels much better was going to cancel his appointment  After feeling better this weekend but he thought he probably should keep it just to make sure things okay, he had quite a bit of wheezing asthma on Friday but this is resolved he is using his inhalers,  No history of malignancy,  Patient corrects its actually every night wheezing when laying down   Started a prednisone pack, helps him stop, not sure if he can go back  Wheezing, better    Anxiety is well doing well with sertraline he would like to continue, he notices a big difference as to other person    Objective   Vital Signs:  /83   Pulse 63   Temp 98.2 °F (36.8 °C) (Temporal)   Ht 175.3 cm (69.02\")   Wt 90.7 kg (200 lb)   SpO2 96%   BMI 29.52 kg/m²   Estimated body mass index is 29.52 kg/m² as calculated from the following:    Height as of this encounter: 175.3 cm (69.02\").    Weight as of this encounter: 90.7 kg (200 lb).          Physical Exam  Vitals reviewed.   Constitutional:       General: He is not in acute distress.     Appearance: Normal appearance. He is well-developed. He is not ill-appearing or diaphoretic.      Comments: Appears well pleasant   HENT:      Head: Normocephalic and atraumatic.      Nose: Nose normal.   Eyes:      Conjunctiva/sclera: Conjunctivae normal.      Pupils: Pupils are equal, round, and " reactive to light.   Neck:      Vascular: No JVD.   Cardiovascular:      Rate and Rhythm: Normal rate and regular rhythm.      Heart sounds: Normal heart sounds. No murmur heard.     Comments: Regular rate and rhythm without murmur  Pulmonary:      Effort: Pulmonary effort is normal. No respiratory distress.      Breath sounds: Normal breath sounds. No wheezing.      Comments: Clear unlabored throughout, able to speak full sentences without dyspnea.  Respirations less than 20  Abdominal:      General: Bowel sounds are normal. There is no distension.      Palpations: Abdomen is soft. There is no mass.      Tenderness: There is no abdominal tenderness. There is no guarding.      Hernia: No hernia is present.   Musculoskeletal:         General: No tenderness.      Cervical back: Neck supple.   Lymphadenopathy:      Cervical: No cervical adenopathy.   Skin:     General: Skin is warm and dry.   Neurological:      General: No focal deficit present.      Mental Status: He is alert and oriented to person, place, and time. Mental status is at baseline.   Psychiatric:         Mood and Affect: Mood normal.         Behavior: Behavior normal.         Thought Content: Thought content normal.         Judgment: Judgment normal.      Result Review :                Assessment and Plan   Diagnoses and all orders for this visit:    1. Community acquired pneumonia, unspecified laterality (Primary)  -     XR Chest PA & Lateral    2. Cough, unspecified type    Other orders  -     predniSONE (DELTASONE) 20 MG tablet; Take 1 tablet by mouth Daily for 7 days. For asthma,  Dispense: 7 tablet; Refill: 0  -     albuterol sulfate  (90 Base) MCG/ACT inhaler; Inhale 2 puffs Every 4 (Four) Hours As Needed for Wheezing.  Dispense: 6.7 g; Refill: 3  -     sertraline (Zoloft) 100 MG tablet; Take 1 tablet by mouth Daily.  Dispense: 90 tablet; Refill: 1             Follow Up   Return in about 6 months (around 11/19/2025) for Print discharge  instructions/educational handouts, Labs before next visit.  Patient was given instructions and counseling regarding his condition or for health maintenance advice. Please see specific information pulled into the AVS if appropriate.     Patient prefers to wait on chest x-ray this would be certainly acceptable as long as he without fever or increased shortness of breath,  Should he have any recurrent hemoptysis,  Urgent recheck and he will need pulmonary evaluation  Should he have any acute hemoptysis chest pain shortness breath fever emergency room promptly  He is doing well with anxiety, and he will continue sertraline   Patient Instructions   Discharge instructions  Chest x-ray if you are not continue to feel better next week  But should you have shortness of breath fever frequent hemoptysis or severe hemoptysis blood in your sputum emergency room or any worsening otherwise you continue to improve  Update me weekly until everything is resolved    Should you have any recurrence of blood in your sputum, you need to see pulmonary for bronchoscopic and/or chest CT  Otherwise,  Prednisone 20 mg a day dose adjusted for 5 to 7 days less likely to have symptoms and certainly will help you breathe better continue sertraline you are doing great

## 2025-05-21 ENCOUNTER — TELEPHONE (OUTPATIENT)
Dept: FAMILY MEDICINE CLINIC | Facility: CLINIC | Age: 50
End: 2025-05-21
Payer: COMMERCIAL

## 2025-06-02 RX ORDER — ICOSAPENT ETHYL 1 G/1
2 CAPSULE ORAL 2 TIMES DAILY WITH MEALS
Qty: 360 CAPSULE | Refills: 3 | Status: SHIPPED | OUTPATIENT
Start: 2025-06-02

## 2025-06-02 RX ORDER — METOPROLOL SUCCINATE 50 MG/1
50 TABLET, EXTENDED RELEASE ORAL DAILY
Qty: 90 TABLET | Refills: 1 | Status: SHIPPED | OUTPATIENT
Start: 2025-06-02

## 2025-06-02 NOTE — TELEPHONE ENCOUNTER
Rx Refill Note  Requested Prescriptions     Pending Prescriptions Disp Refills    metoprolol succinate XL (Toprol XL) 50 MG 24 hr tablet 90 tablet 1     Sig: Take 1 tablet by mouth Daily. For blood pressure and good heart health      Last office visit with prescribing clinician: 5/19/2025   Last telemedicine visit with prescribing clinician: Visit date not found   Next office visit with prescribing clinician: 11/21/2025                         Would you like a call back once the refill request has been completed: [] Yes [] No    If the office needs to give you a call back, can they leave a voicemail: [] Yes [] No    Hoda Ibarra MA  06/02/25, 13:51 EDT

## 2025-06-02 NOTE — TELEPHONE ENCOUNTER
Rx Refill Note  Requested Prescriptions     Pending Prescriptions Disp Refills    metoprolol succinate XL (Toprol XL) 50 MG 24 hr tablet 90 tablet 1     Sig: Take 1 tablet by mouth Daily. For blood pressure and good heart health    icosapent ethyl (VASCEPA) 1 g capsule capsule 360 capsule 3     Sig: Take 2 g by mouth 2 (Two) Times a Day With Meals. For healthy heart and liver      Last office visit with prescribing clinician: 5/19/2025   Last telemedicine visit with prescribing clinician: Visit date not found   Next office visit with prescribing clinician: 11/21/2025                         Would you like a call back once the refill request has been completed: [] Yes [] No    If the office needs to give you a call back, can they leave a voicemail: [] Yes [] No    Hoda Ibarra MA  06/02/25, 13:55 EDT

## 2025-06-10 NOTE — TELEPHONE ENCOUNTER
Rx Refill Note  Requested Prescriptions     Pending Prescriptions Disp Refills    sertraline (Zoloft) 100 MG tablet 90 tablet 1     Sig: Take 1 tablet by mouth Daily.      Last office visit with prescribing clinician: 5/19/2025   Last telemedicine visit with prescribing clinician: Visit date not found   Next office visit with prescribing clinician: 11/21/2025                         Would you like a call back once the refill request has been completed: [] Yes [] No    If the office needs to give you a call back, can they leave a voicemail: [] Yes [] No    Hoda Ibarra MA  06/10/25, 16:01 EDT

## 2025-06-11 RX ORDER — SERTRALINE HYDROCHLORIDE 100 MG/1
100 TABLET, FILM COATED ORAL DAILY
Qty: 90 TABLET | Refills: 0 | Status: SHIPPED | OUTPATIENT
Start: 2025-06-11